# Patient Record
Sex: MALE | Race: WHITE | URBAN - METROPOLITAN AREA
[De-identification: names, ages, dates, MRNs, and addresses within clinical notes are randomized per-mention and may not be internally consistent; named-entity substitution may affect disease eponyms.]

---

## 2022-09-07 ENCOUNTER — OFFICE VISIT (OUTPATIENT)
Dept: PRIMARY CARE CLINIC | Age: 67
End: 2022-09-07
Payer: MEDICARE

## 2022-09-07 ENCOUNTER — HOSPITAL ENCOUNTER (OUTPATIENT)
Dept: GENERAL RADIOLOGY | Age: 67
Discharge: HOME | End: 2022-09-07
Attending: INTERNAL MEDICINE
Payer: MEDICARE

## 2022-09-07 ENCOUNTER — TELEPHONE (OUTPATIENT)
Dept: PRIMARY CARE CLINIC | Age: 67
End: 2022-09-07

## 2022-09-07 VITALS
HEIGHT: 72 IN | SYSTOLIC BLOOD PRESSURE: 106 MMHG | OXYGEN SATURATION: 98 % | RESPIRATION RATE: 18 BRPM | HEART RATE: 52 BPM | TEMPERATURE: 97.3 F | BODY MASS INDEX: 33.18 KG/M2 | DIASTOLIC BLOOD PRESSURE: 57 MMHG | WEIGHT: 245 LBS

## 2022-09-07 DIAGNOSIS — S51.001A OPEN WOUND OF RIGHT ELBOW, INITIAL ENCOUNTER: ICD-10-CM

## 2022-09-07 DIAGNOSIS — G25.0 ESSENTIAL TREMOR: ICD-10-CM

## 2022-09-07 DIAGNOSIS — M54.9 ACUTE UPPER BACK PAIN: ICD-10-CM

## 2022-09-07 DIAGNOSIS — R09.89 RESPIRATORY CRACKLES AT LEFT LUNG BASE: ICD-10-CM

## 2022-09-07 DIAGNOSIS — J30.1 SEASONAL ALLERGIC RHINITIS DUE TO POLLEN: ICD-10-CM

## 2022-09-07 DIAGNOSIS — R45.86 MOOD SWINGS: ICD-10-CM

## 2022-09-07 DIAGNOSIS — J44.9 CHRONIC OBSTRUCTIVE PULMONARY DISEASE, UNSPECIFIED COPD TYPE (HCC): ICD-10-CM

## 2022-09-07 DIAGNOSIS — L30.9 ECZEMA, UNSPECIFIED TYPE: ICD-10-CM

## 2022-09-07 DIAGNOSIS — D64.9 ANEMIA, UNSPECIFIED TYPE: Primary | ICD-10-CM

## 2022-09-07 DIAGNOSIS — E78.00 HYPERCHOLESTEREMIA: ICD-10-CM

## 2022-09-07 DIAGNOSIS — Z12.5 PROSTATE CANCER SCREENING: ICD-10-CM

## 2022-09-07 DIAGNOSIS — F41.9 ANXIETY: ICD-10-CM

## 2022-09-07 DIAGNOSIS — C83.10 MANTLE CELL LYMPHOMA, UNSPECIFIED BODY REGION (HCC): Primary | ICD-10-CM

## 2022-09-07 DIAGNOSIS — N40.1 BENIGN PROSTATIC HYPERPLASIA WITH LOWER URINARY TRACT SYMPTOMS, SYMPTOM DETAILS UNSPECIFIED: ICD-10-CM

## 2022-09-07 DIAGNOSIS — K21.9 GASTROESOPHAGEAL REFLUX DISEASE WITHOUT ESOPHAGITIS: ICD-10-CM

## 2022-09-07 DIAGNOSIS — Z97.4 WEARS HEARING AID IN BOTH EARS: ICD-10-CM

## 2022-09-07 PROCEDURE — G8417 CALC BMI ABV UP PARAM F/U: HCPCS | Performed by: INTERNAL MEDICINE

## 2022-09-07 PROCEDURE — 71046 X-RAY EXAM CHEST 2 VIEWS: CPT

## 2022-09-07 PROCEDURE — G8427 DOCREV CUR MEDS BY ELIG CLIN: HCPCS | Performed by: INTERNAL MEDICINE

## 2022-09-07 PROCEDURE — 72072 X-RAY EXAM THORAC SPINE 3VWS: CPT

## 2022-09-07 PROCEDURE — 99204 OFFICE O/P NEW MOD 45 MIN: CPT | Performed by: INTERNAL MEDICINE

## 2022-09-07 PROCEDURE — G8510 SCR DEP NEG, NO PLAN REQD: HCPCS | Performed by: INTERNAL MEDICINE

## 2022-09-07 PROCEDURE — 1101F PT FALLS ASSESS-DOCD LE1/YR: CPT | Performed by: INTERNAL MEDICINE

## 2022-09-07 PROCEDURE — G8536 NO DOC ELDER MAL SCRN: HCPCS | Performed by: INTERNAL MEDICINE

## 2022-09-07 PROCEDURE — 1123F ACP DISCUSS/DSCN MKR DOCD: CPT | Performed by: INTERNAL MEDICINE

## 2022-09-07 PROCEDURE — 3017F COLORECTAL CA SCREEN DOC REV: CPT | Performed by: INTERNAL MEDICINE

## 2022-09-07 RX ORDER — FLUTICASONE PROPIONATE 50 MCG
1 SPRAY, SUSPENSION (ML) NASAL AS NEEDED
COMMUNITY

## 2022-09-07 RX ORDER — ATORVASTATIN CALCIUM 20 MG/1
20 TABLET, FILM COATED ORAL DAILY
COMMUNITY

## 2022-09-07 RX ORDER — CEPHALEXIN 500 MG/1
500 CAPSULE ORAL 3 TIMES DAILY
Qty: 21 CAPSULE | Refills: 0 | Status: SHIPPED | OUTPATIENT
Start: 2022-09-07 | End: 2022-09-14

## 2022-09-07 RX ORDER — LAMOTRIGINE 200 MG/1
200 TABLET ORAL DAILY
COMMUNITY

## 2022-09-07 RX ORDER — TIOTROPIUM BROMIDE 18 UG/1
CAPSULE ORAL; RESPIRATORY (INHALATION)
COMMUNITY
Start: 2022-08-27 | End: 2022-10-27 | Stop reason: SDUPTHER

## 2022-09-07 RX ORDER — LITHIUM CARBONATE 300 MG/1
TABLET, FILM COATED, EXTENDED RELEASE ORAL
COMMUNITY
Start: 2022-06-20

## 2022-09-07 RX ORDER — WARFARIN 1 MG/1
1 TABLET ORAL DAILY
COMMUNITY
Start: 2022-06-30

## 2022-09-07 RX ORDER — GABAPENTIN 100 MG/1
100 CAPSULE ORAL 2 TIMES DAILY
COMMUNITY
Start: 2022-06-20

## 2022-09-07 RX ORDER — ALBUTEROL SULFATE 0.83 MG/ML
SOLUTION RESPIRATORY (INHALATION)
COMMUNITY
Start: 2022-08-18

## 2022-09-07 RX ORDER — PRIMIDONE 50 MG/1
TABLET ORAL
COMMUNITY
Start: 2022-06-01

## 2022-09-07 RX ORDER — AZELASTINE 1 MG/ML
SPRAY, METERED NASAL
COMMUNITY
Start: 2022-07-23

## 2022-09-07 RX ORDER — CLONAZEPAM 0.5 MG/1
0.5 TABLET ORAL
Qty: 30 TABLET | Refills: 0 | Status: SHIPPED | OUTPATIENT
Start: 2022-09-07 | End: 2022-10-09

## 2022-09-07 RX ORDER — TRIAMCINOLONE ACETONIDE 0.25 MG/G
OINTMENT TOPICAL 2 TIMES DAILY
COMMUNITY
End: 2022-09-07 | Stop reason: SDUPTHER

## 2022-09-07 RX ORDER — FINASTERIDE 5 MG/1
5 TABLET, FILM COATED ORAL DAILY
COMMUNITY
Start: 2022-09-01

## 2022-09-07 RX ORDER — DOXEPIN HYDROCHLORIDE 10 MG/1
CAPSULE ORAL
COMMUNITY
Start: 2022-08-18

## 2022-09-07 RX ORDER — MINERAL OIL
180 ENEMA (ML) RECTAL DAILY
COMMUNITY

## 2022-09-07 RX ORDER — BENZONATATE 100 MG/1
100 CAPSULE ORAL
COMMUNITY

## 2022-09-07 RX ORDER — TAMSULOSIN HYDROCHLORIDE 0.4 MG/1
CAPSULE ORAL
COMMUNITY
Start: 2022-08-08

## 2022-09-07 RX ORDER — CITALOPRAM 20 MG/1
TABLET, FILM COATED ORAL
COMMUNITY
Start: 2022-08-08

## 2022-09-07 RX ORDER — CLONAZEPAM 0.5 MG/1
0.25 TABLET ORAL AS NEEDED
COMMUNITY

## 2022-09-07 RX ORDER — TRIAMCINOLONE ACETONIDE 0.25 MG/G
OINTMENT TOPICAL 2 TIMES DAILY
Qty: 30 G | Refills: 4 | Status: SHIPPED | OUTPATIENT
Start: 2022-09-07

## 2022-09-07 RX ORDER — OMEPRAZOLE 40 MG/1
40 CAPSULE, DELAYED RELEASE ORAL DAILY
Qty: 90 CAPSULE | Refills: 1 | Status: SHIPPED | OUTPATIENT
Start: 2022-09-07

## 2022-09-07 NOTE — PROGRESS NOTES
Chief Complaint   Patient presents with    New Patient        Visit Vitals  BP (!) 106/57 (BP 1 Location: Left upper arm, BP Patient Position: Sitting)   Pulse (!) 52   Temp 97.3 °F (36.3 °C) (Temporal)   Resp 18   Ht 6' (1.829 m)   Wt 245 lb (111.1 kg)   SpO2 98%   BMI 33.23 kg/m²        Health Maintenance Due   Topic    Hepatitis C Screening     Depression Screen     COVID-19 Vaccine (1)    DTaP/Tdap/Td series (1 - Tdap)    Lipid Screen     Colorectal Cancer Screening Combo     Shingrix Vaccine Age 50> (1 of 2)    Pneumococcal 65+ years (1 - PCV)    Medicare Yearly Exam     Flu Vaccine (1)        1. Have you been to the ER, urgent care clinic since your last visit? Hospitalized since your last visit? No    2. Have you seen or consulted any other health care providers outside of the [address] [place] [address] since your last visit? Include any pap smears or colon screening.  No

## 2022-09-07 NOTE — PROGRESS NOTES
Kathy Beaulieu is a 78 y.o.  male and presents with     Chief Complaint   Patient presents with    New Patient     Pt is here to establish care. Pt used to live in [place] and moved in May. Daughters live her. Pt used to see Psychiatrist [facility]  Pt has appointment with Neurologist in November for essential tremors  Pt has appt with Pulmonologist.  Was seeing Dr Giancarlo Valenzuela in [place] - for many years. Pt used to be a   Pt is currently on oxygen , 2.5 liters, says not related to smoking. Never smoked. Has a port for chemo - got clogged - is on warfarin  Has BPH. Has GERD>. Pt goes to [facility] for mantle cell carcnioma  USed to see cardiologist , Dr Kristel Bob once a year. Had labs done in May  Had CXR in early part of the year. Macy Dakin on concrete side walk - back is hurting      Wears hearing  aids. No past medical history on file. No past surgical history on file. Current Outpatient Medications   Medication Sig    primidone (MYSOLINE) 50 mg tablet TAKE 1 AND 1/2 TABLETS BY MOUTH EVERY MORNING AND 2 TABLETS EVERY EVENING    cephALEXin (KEFLEX) 500 mg capsule Take 1 Capsule by mouth three (3) times daily for 7 days. triamcinolone acetonide (KENALOG) 0.025 % ointment Apply  to affected area two (2) times a day. omeprazole (PRILOSEC) 40 mg capsule Take 1 Capsule by mouth daily. clonazePAM (KlonoPIN) 0.5 mg tablet Take 1 Tablet by mouth nightly as needed for Anxiety. Max Daily Amount: 0.5 mg.    albuterol (PROVENTIL VENTOLIN) 2.5 mg /3 mL (0.083 %) nebu USE 1 VIAL VIA NEBULIZER EVERY 4 TO 6 HOURS    atorvastatin (LIPITOR) 20 mg tablet Take 20 mg by mouth daily. azelastine (ASTELIN) 137 mcg (0.1 %) nasal spray USE 2 SPRAYS IN EACH NOSTRIL TWICE DAILY    benzonatate (TESSALON) 100 mg capsule Take 100 mg by mouth three (3) times daily as needed.     citalopram (CELEXA) 20 mg tablet TAKE 1 TABLET BY MOUTH EVERY DAY AT 7 PM    clonazePAM (KlonoPIN) 0.5 mg tablet Take 0.25 mg by mouth as needed. doxepin (SINEquan) 10 mg capsule TAKE 1 CAPSULE BY MOUTH AT BEDTIME    fexofenadine (ALLEGRA) 180 mg tablet Take 180 mg by mouth daily. finasteride (PROSCAR) 5 mg tablet Take 5 mg by mouth daily. fluticasone propionate (FLONASE) 50 mcg/actuation nasal spray 1 Spray by Nasal route as needed. gabapentin (NEURONTIN) 100 mg capsule Take 100 mg by mouth two (2) times a day. lamoTRIgine (LaMICtal) 200 mg tablet Take 200 mg by mouth daily. lithium carbonate SR (LITHOBID) 300 mg CR tablet TAKE 2 TABLETS BY MOUTH AT 7 PM    tamsulosin (FLOMAX) 0.4 mg capsule TAKE 2 CAPSULES BY MOUTH TWICE DAILY    Spiriva with HandiHaler 18 mcg inhalation capsule INHALE THE CONTENTS OF 1 CAPSULE VIA INHALATION DEVICE EVERY DAY    warfarin (COUMADIN) 1 mg tablet Take 1 mg by mouth daily. No current facility-administered medications for this visit. Health Maintenance   Topic Date Due    Hepatitis C Screening  Never done    COVID-19 Vaccine (1) Never done    Pneumococcal 65+ years (1 - PCV) Never done    Lipid Screen  Never done    Shingrix Vaccine Age 50> (1 of 2) Never done    DTaP/Tdap/Td series (1 - Tdap) Never done    Colorectal Cancer Screening Combo  Never done    Medicare Yearly Exam  Never done    Flu Vaccine (1) Never done    Depression Screen  09/07/2023       There is no immunization history on file for this patient. No LMP for male patient. Allergies and Intolerances: Allergies   Allergen Reactions    Prazosin Anaphylaxis    Percocet [Oxycodone-Acetaminophen] Other (comments)     Hallucinate       Family History:   No family history on file. Social History:   He  reports that he has never smoked. He has never used smokeless tobacco.  He  reports no history of alcohol use.             Review of Systems:   General: negative for - chills, fatigue, fever, weight change  Psych: negative for - anxiety, depression, irritability or mood swings  ENT: negative for - headaches, hearing change, nasal congestion, oral lesions, sneezing or sore throat  Heme/ Lymph: negative for - bleeding problems, bruising, pallor or swollen lymph nodes  Endo: negative for - hot flashes, polydipsia/polyuria or temperature intolerance  Resp: negative for - cough, shortness of breath or wheezing  CV: negative for - chest pain, edema or palpitations  GI: negative for - abdominal pain, change in bowel habits, constipation, diarrhea or nausea/vomiting  : negative for - dysuria, hematuria, incontinence, pelvic pain or vulvar/vaginal symptoms  MSK: negative for - joint pain, joint swelling or muscle pain  Neuro: negative for - confusion, headaches, seizures or weakness  Derm: negative for - dry skin, hair changes, rash or skin lesion changes          Physical:   Vitals:   Vitals:    09/07/22 1131   BP: (!) 106/57   Pulse: (!) 52   Resp: 18   Temp: 97.3 °F (36.3 °C)   TempSrc: Temporal   SpO2: 98%   Weight: 245 lb (111.1 kg)   Height: 6' (1.829 m)           Exam:   HEENT- atraumatic,normocephalic, awake, oriented, well nourished, on 2.5 litres of oxygen, eczema around the external ears. Neck - supple,no enlarged lymph nodes, no JVD, no thyromegaly  Chest- CTA, no rhonchi, crackles left base  Heart- rrr, no murmurs / gallop/rub  Abdomen- soft,BS+,NT, no hepatosplenomegaly  Ext - no c/c/edema   Neuro- no focal deficits. Power 5/5 all extremities,essential tremors in both hands  Skin - warm,dry, no obvious rashes. Review of Data:   LABS:   No results found for: WBC, HGB, HCT, PLT, HGBEXT, HCTEXT, PLTEXT, HGBEXT, HCTEXT, PLTEXT  No results found for: NA, K, CL, CO2, GLU, BUN, CREA  No results found for: CHOL, CHOLX, CHLST, CHOLV, HDL, HDLP, LDL, LDLC, DLDLP, TGLX, TRIGL, TRIGP  No components found for: GPT        Impression / Plan:        ICD-10-CM ICD-9-CM    1. Mantle cell lymphoma, unspecified body region ([facility]  C83.10 200.40       2.  Hypercholesteremia  E78.00 272.0 CBC WITH AUTOMATED DIFF METABOLIC PANEL, COMPREHENSIVE      LIPID PANEL      3. Benign prostatic hyperplasia with lower urinary tract symptoms, symptom details unspecified  N40.1 600.01 REFERRAL TO UROLOGY      4. Chronic obstructive pulmonary disease, unspecified COPD type ([facility]  J44.9 496       5. Anxiety  F41.9 300.00 REFERRAL TO PSYCHIATRY      clonazePAM (KlonoPIN) 0.5 mg tablet      6. Gastroesophageal reflux disease without esophagitis  K21.9 530.81 omeprazole (PRILOSEC) 40 mg capsule      7. Mood swings  R45.86 799.24 REFERRAL TO PSYCHIATRY      8. Seasonal allergic rhinitis due to pollen  J30.1 477.0       9. Prostate cancer screening  Z12.5 V76.44 PSA SCREENING (SCREENING)      10. Essential tremor  G25.0 333.1       11. Acute upper back pain  M54.9 724.5 XR SPINE THORAC 3 V     338.19       12. Wears hearing aid in both ears  Z97.4 [id number]       13. Respiratory crackles at left lung base  R09.89 786.7 XR CHEST PA LAT      14. Open wound of right elbow, initial encounter  S51.001A 881.01 cephALEXin (KEFLEX) 500 mg capsule      15. Eczema, unspecified type  L30.9 692.9 triamcinolone acetonide (KENALOG) 0.025 % ointment        Gave list of Mental health providers in the region  Informed pt that he could still contnue seeing Psychiatrist in [place] till he establishes new Psych in [place] to patient risk benefits of the medications. Advised patient to stop meds if having any side effects. Pt verbalized understanding of the instructions. I have discussed the diagnosis with the patient and the intended plan as seen in the above orders. The patient has received an after-visit summary and questions were answered concerning future plans. I have discussed medication side effects and warnings with the patient as well. I have reviewed the plan of care with the patient, accepted their input and they are in agreement with the treatment goals. Reviewed plan of care.  Patient has provided input and agrees with goals.    Follow-up and Dispositions    Return in about 3 months (around 12/7/2022).          Blayne Taylor MD

## 2022-09-08 ENCOUNTER — TELEPHONE (OUTPATIENT)
Dept: PRIMARY CARE CLINIC | Age: 67
End: 2022-09-08

## 2022-09-08 NOTE — PROGRESS NOTES
Rt anurag diaphragm is elevated and there is small amount of fluid in the lung on the left side. Pt should make follow up appt in a few months. Will repeat CXR in a few months  WOuld advise incentive spirometry .   Reduce fluid intake

## 2022-09-09 ENCOUNTER — TELEPHONE (OUTPATIENT)
Dept: PRIMARY CARE CLINIC | Age: 67
End: 2022-09-09

## 2022-09-09 NOTE — TELEPHONE ENCOUNTER
Xray results reviewed with patient. Patient also had questions about his other xray that was done. However that one has not been reviewed. Advised patient I will get a message to the provider and once it has been reviewed I will call him back,  Also provided patient with the link to setup his [employer] account.

## 2022-09-09 NOTE — TELEPHONE ENCOUNTER
Spoke to patient and informed about lab results  and about imaging results  patient will come in next week to get labs and stool kit.

## 2022-09-09 NOTE — TELEPHONE ENCOUNTER
----- Message from Grover King MD sent at 9/7/2022 10:51 PM EDT -----  Rt anurag diaphragm is elevated and there is small amount of fluid in the lung on the left side. Pt should make follow up appt in a few months. Will repeat CXR in a few months  WOuld advise incentive spirometry .   Reduce fluid intake

## 2022-09-09 NOTE — TELEPHONE ENCOUNTER
----- Message from Isaías Perez MD sent at 9/7/2022 11:12 PM EDT -----  All labs are normal except mild anemia  I have ordered iron studies and stool test  Patient may get it done

## 2022-09-12 NOTE — PROGRESS NOTES
Xray shows - 1. Age-indeterminate wedging deformity of a lower thoracic vertebral body.     Could be related to old  injury  or fall

## 2022-09-13 ENCOUNTER — TELEPHONE (OUTPATIENT)
Dept: PRIMARY CARE CLINIC | Age: 67
End: 2022-09-13

## 2022-09-13 DIAGNOSIS — D64.9 ANEMIA, UNSPECIFIED TYPE: Primary | ICD-10-CM

## 2022-09-13 NOTE — TELEPHONE ENCOUNTER
Patient aware of xray results of the spine. Xray shows - 1. Age-indeterminate wedging deformity of a lower thoracic vertebral body. Could be related to old  injury  or fall     Results mailed to patient.

## 2022-09-15 ENCOUNTER — TELEPHONE (OUTPATIENT)
Dept: PRIMARY CARE CLINIC | Age: 67
End: 2022-09-15

## 2022-09-15 NOTE — TELEPHONE ENCOUNTER
----- Message from Grey Castellanos MD sent at 9/14/2022  8:25 PM EDT -----  Iron stores are normal.  There is no evidence of iron deficiency

## 2022-09-17 LAB — HEMOCCULT STL QL IA: POSITIVE

## 2022-09-20 DIAGNOSIS — R19.5 POSITIVE OCCULT STOOL BLOOD TEST: Primary | ICD-10-CM

## 2022-09-21 ENCOUNTER — TELEPHONE (OUTPATIENT)
Dept: PRIMARY CARE CLINIC | Age: 67
End: 2022-09-21

## 2022-09-21 NOTE — TELEPHONE ENCOUNTER
----- Message from Maria Luz Franco MD sent at 9/20/2022  8:06 AM EDT -----  Hemoccult test is positive.   I am referring patient to gastroenterology

## 2022-10-07 DIAGNOSIS — F41.9 ANXIETY: ICD-10-CM

## 2022-10-09 RX ORDER — CLONAZEPAM 0.5 MG/1
TABLET ORAL
Qty: 30 TABLET | Refills: 0 | Status: SHIPPED | OUTPATIENT
Start: 2022-10-09

## 2022-10-10 DIAGNOSIS — F41.9 ANXIETY: Primary | ICD-10-CM

## 2022-11-10 ENCOUNTER — OFFICE VISIT (OUTPATIENT)
Dept: PRIMARY CARE CLINIC | Age: 67
End: 2022-11-10
Payer: MEDICARE

## 2022-11-10 VITALS
RESPIRATION RATE: 18 BRPM | BODY MASS INDEX: 32.64 KG/M2 | SYSTOLIC BLOOD PRESSURE: 101 MMHG | HEART RATE: 53 BPM | OXYGEN SATURATION: 98 % | HEIGHT: 72 IN | WEIGHT: 241 LBS | DIASTOLIC BLOOD PRESSURE: 60 MMHG | TEMPERATURE: 97.3 F

## 2022-11-10 DIAGNOSIS — Z00.00 MEDICARE ANNUAL WELLNESS VISIT, INITIAL: Primary | ICD-10-CM

## 2022-11-10 DIAGNOSIS — N40.1 BENIGN PROSTATIC HYPERPLASIA WITH LOWER URINARY TRACT SYMPTOMS, SYMPTOM DETAILS UNSPECIFIED: ICD-10-CM

## 2022-11-10 DIAGNOSIS — J44.9 CHRONIC OBSTRUCTIVE PULMONARY DISEASE, UNSPECIFIED COPD TYPE (HCC): ICD-10-CM

## 2022-11-10 DIAGNOSIS — G25.0 ESSENTIAL TREMOR: ICD-10-CM

## 2022-11-10 DIAGNOSIS — C83.10 MANTLE CELL LYMPHOMA, UNSPECIFIED BODY REGION (HCC): ICD-10-CM

## 2022-11-10 DIAGNOSIS — M1A.9XX1 TOPHI: ICD-10-CM

## 2022-11-10 DIAGNOSIS — F41.9 ANXIETY: ICD-10-CM

## 2022-11-10 DIAGNOSIS — E78.00 HYPERCHOLESTEREMIA: ICD-10-CM

## 2022-11-10 DIAGNOSIS — K21.9 GASTROESOPHAGEAL REFLUX DISEASE WITHOUT ESOPHAGITIS: ICD-10-CM

## 2022-11-10 PROCEDURE — 1123F ACP DISCUSS/DSCN MKR DOCD: CPT | Performed by: INTERNAL MEDICINE

## 2022-11-10 PROCEDURE — 3017F COLORECTAL CA SCREEN DOC REV: CPT | Performed by: INTERNAL MEDICINE

## 2022-11-10 PROCEDURE — G8432 DEP SCR NOT DOC, RNG: HCPCS | Performed by: INTERNAL MEDICINE

## 2022-11-10 PROCEDURE — G0438 PPPS, INITIAL VISIT: HCPCS | Performed by: INTERNAL MEDICINE

## 2022-11-10 PROCEDURE — G8427 DOCREV CUR MEDS BY ELIG CLIN: HCPCS | Performed by: INTERNAL MEDICINE

## 2022-11-10 PROCEDURE — G8536 NO DOC ELDER MAL SCRN: HCPCS | Performed by: INTERNAL MEDICINE

## 2022-11-10 PROCEDURE — 1101F PT FALLS ASSESS-DOCD LE1/YR: CPT | Performed by: INTERNAL MEDICINE

## 2022-11-10 PROCEDURE — G8417 CALC BMI ABV UP PARAM F/U: HCPCS | Performed by: INTERNAL MEDICINE

## 2022-11-10 RX ORDER — CLONAZEPAM 0.5 MG/1
0.5 TABLET ORAL
Qty: 30 TABLET | Refills: 0 | Status: SHIPPED | OUTPATIENT
Start: 2022-11-10

## 2022-11-10 NOTE — PROGRESS NOTES
[facility] Medicare Wellness Visit      Domenic Rodriguez is a 78 y.o. male and presents for Annual Medicare Wellness Visit. Vitals:    11/10/22 1132   BP: 101/60   Pulse: (!) 53   Resp: 18   Temp: 97.3 °F (36.3 °C)   TempSrc: Temporal   SpO2: 98%   Weight: 241 lb (109.3 kg)   Height: 6' (1.829 m)   PainSc:   0 - No pain        Depression Screen:   3 most recent PHQ Screens 11/10/2022   Little interest or pleasure in doing things Not at all   Feeling down, depressed, irritable, or hopeless Not at all   Total Score PHQ 2 0   Trouble falling or staying asleep, or sleeping too much -   Feeling tired or having little energy -   Poor appetite, weight loss, or overeating -   Feeling bad about yourself - or that you are a failure or have let yourself or your family down -   Trouble concentrating on things such as school, work, reading, or watching TV -   Moving or speaking so slowly that other people could have noticed; or the opposite being so fidgety that others notice -   Thoughts of being better off dead, or hurting yourself in some way -   PHQ 9 Score -   How difficult have these problems made it for you to do your work, take care of your home and get along with others -       Fall Risk Assessment:    Fall Risk Assessment, last 12 mths 9/7/2022   Able to walk? Yes   Fall in past 12 months? 1   Do you feel unsteady? 1   Are you worried about falling 1   Number of falls in past 12 months 1   Fall with injury? 1       Abuse Screen:   Abuse Screening Questionnaire 11/10/2022   Do you ever feel afraid of your partner? N   Are you in a relationship with someone who physically or mentally threatens you? N   Is it safe for you to go home? Y       Health Maintenance Due   Topic    Hepatitis C Screening     COVID-19 Vaccine (1)    Pneumococcal 65+ years (1 - PCV)    Shingrix Vaccine Age 50> (1 of 2)    DTaP/Tdap/Td series (1 - Tdap)    Medicare Yearly Exam        1. Have you been to the ER, urgent care clinic since your last visit? Hospitalized since your last visit? No    2. Have you seen or consulted any other health care providers outside of the [address] [place] [address] since your last visit? Include any pap smears or colon screening.  No

## 2022-11-10 NOTE — PROGRESS NOTES
Domenic Rodriguez is a 78 y.o.  male and presents with     Chief Complaint   Patient presents with    Form Completion     DMV forms     Annual Wellness Visit     Look at right elbow      Pt is seeing Psych - [facility]  HAs appt with Neurologist- DR Zuri Haney next month  Pt had a fall and injured his face and elbow and devloped a swelling over the rt elbow. At the time he was taking blood thinner. Pt had a port for mantle cell lymhoma and was anticoagulated at the time  Pt is requesting clonopin till he sees Neurologts. Had hem pos stools - has appt with GI, Dec 13th. Gets crusting around left ear. On 2 liters ox oxygen. No past medical history on file. No past surgical history on file. Current Outpatient Medications   Medication Sig    clonazePAM (KlonoPIN) 0.5 mg tablet Take 1 Tablet by mouth nightly as needed for Anxiety. Max Daily Amount: 0.5 mg. Spiriva with HandiHaler 18 mcg inhalation capsule Take 1 Capsule by inhalation daily. albuterol (PROVENTIL VENTOLIN) 2.5 mg /3 mL (0.083 %) nebu USE 1 VIAL VIA NEBULIZER EVERY 4 TO 6 HOURS    atorvastatin (LIPITOR) 20 mg tablet Take 20 mg by mouth daily. azelastine (ASTELIN) 137 mcg (0.1 %) nasal spray USE 2 SPRAYS IN EACH NOSTRIL TWICE DAILY    benzonatate (TESSALON) 100 mg capsule Take 100 mg by mouth three (3) times daily as needed. citalopram (CELEXA) 20 mg tablet TAKE 1 TABLET BY MOUTH EVERY DAY AT 7 PM    clonazePAM (KlonoPIN) 0.5 mg tablet Take 0.25 mg by mouth as needed. doxepin (SINEquan) 10 mg capsule TAKE 1 CAPSULE BY MOUTH AT BEDTIME    fexofenadine (ALLEGRA) 180 mg tablet Take 180 mg by mouth daily. finasteride (PROSCAR) 5 mg tablet Take 5 mg by mouth daily. fluticasone propionate (FLONASE) 50 mcg/actuation nasal spray 1 Spray by Nasal route as needed. gabapentin (NEURONTIN) 100 mg capsule Take 100 mg by mouth two (2) times a day. lamoTRIgine (LaMICtal) 200 mg tablet Take 200 mg by mouth daily.     lithium carbonate SR (LITHOBID) 300 mg CR tablet TAKE 2 TABLETS BY MOUTH AT 7 PM    primidone (MYSOLINE) 50 mg tablet TAKE 1 AND 1/2 TABLETS BY MOUTH EVERY MORNING AND 2 TABLETS EVERY EVENING    tamsulosin (FLOMAX) 0.4 mg capsule TAKE 2 CAPSULES BY MOUTH TWICE DAILY    warfarin (COUMADIN) 1 mg tablet Take 1 mg by mouth daily. triamcinolone acetonide (KENALOG) 0.025 % ointment Apply  to affected area two (2) times a day. omeprazole (PRILOSEC) 40 mg capsule Take 1 Capsule by mouth daily. No current facility-administered medications for this visit. Health Maintenance   Topic Date Due    COVID-19 Vaccine (1) Never done    Pneumococcal 65+ years (1 - PCV) Never done    Shingrix Vaccine Age 50> (1 of 2) Never done    DTaP/Tdap/Td series (1 - Tdap) Never done    Lipid Screen  09/07/2023    Colorectal Cancer Screening Combo  09/15/2023    Depression Screen  11/10/2023    Medicare Yearly Exam  11/11/2023    Flu Vaccine  Completed    Hepatitis C Screening  Discontinued     Immunization History   Administered Date(s) Administered    Influenza High Dose Vaccine PF 10/11/2022     No LMP for male patient. Allergies and Intolerances: Allergies   Allergen Reactions    Prazosin Anaphylaxis    Percocet [Oxycodone-Acetaminophen] Other (comments)     Hallucinate       Family History:   No family history on file. Social History:   He  reports that he has never smoked. He has never used smokeless tobacco.  He  reports no history of alcohol use.             Review of Systems:   General: negative for - chills, fatigue, fever, weight change  Psych: negative for - anxiety, depression, irritability or mood swings  ENT: negative for - headaches, hearing change, nasal congestion, oral lesions, sneezing or sore throat  Heme/ Lymph: negative for - bleeding problems, bruising, pallor or swollen lymph nodes  Endo: negative for - hot flashes, polydipsia/polyuria or temperature intolerance  Resp: negative for - cough, shortness of breath or wheezing  CV: negative for - chest pain, edema or palpitations  GI: negative for - abdominal pain, change in bowel habits, constipation, diarrhea or nausea/vomiting  : negative for - dysuria, hematuria, incontinence, pelvic pain or vulvar/vaginal symptoms  MSK: negative for - joint pain, joint swelling or muscle pain  Neuro: negative for - confusion, headaches, seizures or weakness  Derm: negative for - dry skin, hair changes, rash or skin lesion changes          Physical:   Vitals:   Vitals:    11/10/22 1132   BP: 101/60   Pulse: (!) 53   Resp: 18   Temp: 97.3 °F (36.3 °C)   TempSrc: Temporal   SpO2: 98%   Weight: 241 lb (109.3 kg)   Height: 6' (1.829 m)           Exam:   HEENT- atraumatic,normocephalic, awake, oriented, well nourished, crusting around the ear. Chest- CTA, no rhonchi, no crackles  Heart- rrr, no murmurs / gallop/rub  Abdomen- soft,BS+,NT, no hepatosplenomegaly  Ext - no c/c/edema , tophus over right elbow  Neuro- no focal deficits. Power 5/5 all extremities  Skin - warm,dry, no obvious rashes. Review of Data:   LABS:   Lab Results   Component Value Date/Time    WBC 4.6 09/07/2022 12:44 PM    HGB 11.5 (L) 09/07/2022 12:44 PM    HCT 36.0 (L) 09/07/2022 12:44 PM    PLATELET 122 78/83/6570 12:44 PM     Lab Results   Component Value Date/Time    Sodium 140 09/07/2022 12:44 PM    Potassium 4.4 09/07/2022 12:44 PM    Chloride 105 09/07/2022 12:44 PM    CO2 32 09/07/2022 12:44 PM    Glucose 93 09/07/2022 12:44 PM    BUN 18 09/07/2022 12:44 PM    Creatinine 0.91 09/07/2022 12:44 PM     Lab Results   Component Value Date/Time    Cholesterol, total 141 09/07/2022 12:44 PM    HDL Cholesterol 49 09/07/2022 12:44 PM    LDL, calculated 62.4 09/07/2022 12:44 PM    Triglyceride 148 09/07/2022 12:44 PM     No components found for: GPT        Impression / Plan:        ICD-10-CM ICD-9-CM    1. Medicare annual wellness visit, initial  Z00.00 V70.0       2.  Anxiety  F41.9 300.00 clonazePAM (KlonoPIN) 0.5 mg tablet      3. Mantle cell lymphoma, unspecified body region ([facility]  C83.10 200.40       4. Hypercholesteremia  E78.00 272.0       5. Benign prostatic hyperplasia with lower urinary tract symptoms, symptom details unspecified  N40.1 600.01       6. Chronic obstructive pulmonary disease, unspecified COPD type ([facility]  J44.9 496       7. Gastroesophageal reflux disease without esophagitis  K21.9 530.81       8. Essential tremor  G25.0 333.1       9. Tophi  M1A. 9XX1 274.03 URIC ACID      CYCLIC CITRUL PEPTIDE AB, IGG      CYCLIC CITRUL PEPTIDE AB, IGG      URIC ACID        DMV forms filled  Explained to patient risk benefits of the medications. Advised patient to stop meds if having any side effects. Pt verbalized understanding of the instructions. I have discussed the diagnosis with the patient and the intended plan as seen in the above orders. The patient has received an after-visit summary and questions were answered concerning future plans. I have discussed medication side effects and warnings with the patient as well. I have reviewed the plan of care with the patient, accepted their input and they are in agreement with the treatment goals. Reviewed plan of care. Patient has provided input and agrees with goals. Follow-up and Dispositions    Return in about 4 months (around 3/10/2023). Felix Buenrostro MD      ------------------------------------------------------    (AWV) The Initial Medicare Annual Wellness Exam PROGRESS NOTE    This is an Initial Medicare Annual Wellness Exam (AWV) (Performed 12 months after IPPE or effective date of Medicare Part B enrollment, Once in a lifetime)    I have reviewed the patient's medical history in detail and updated the computerized patient record. Morena Khalil is a 78 y.o.  male and presents for an annual wellness exam       ROS   Negative except anxiety, tremors    All other systems reviewed and are negative.     History   No past medical history on file. No past surgical history on file. Current Outpatient Medications   Medication Sig Dispense Refill    clonazePAM (KlonoPIN) 0.5 mg tablet Take 1 Tablet by mouth nightly as needed for Anxiety. Max Daily Amount: 0.5 mg. 30 Tablet 0    Spiriva with HandiHaler 18 mcg inhalation capsule Take 1 Capsule by inhalation daily. 30 Capsule 3    albuterol (PROVENTIL VENTOLIN) 2.5 mg /3 mL (0.083 %) nebu USE 1 VIAL VIA NEBULIZER EVERY 4 TO 6 HOURS      atorvastatin (LIPITOR) 20 mg tablet Take 20 mg by mouth daily. azelastine (ASTELIN) 137 mcg (0.1 %) nasal spray USE 2 SPRAYS IN EACH NOSTRIL TWICE DAILY      benzonatate (TESSALON) 100 mg capsule Take 100 mg by mouth three (3) times daily as needed. citalopram (CELEXA) 20 mg tablet TAKE 1 TABLET BY MOUTH EVERY DAY AT 7 PM      clonazePAM (KlonoPIN) 0.5 mg tablet Take 0.25 mg by mouth as needed. doxepin (SINEquan) 10 mg capsule TAKE 1 CAPSULE BY MOUTH AT BEDTIME      fexofenadine (ALLEGRA) 180 mg tablet Take 180 mg by mouth daily. finasteride (PROSCAR) 5 mg tablet Take 5 mg by mouth daily. fluticasone propionate (FLONASE) 50 mcg/actuation nasal spray 1 Spray by Nasal route as needed. gabapentin (NEURONTIN) 100 mg capsule Take 100 mg by mouth two (2) times a day. lamoTRIgine (LaMICtal) 200 mg tablet Take 200 mg by mouth daily. lithium carbonate SR (LITHOBID) 300 mg CR tablet TAKE 2 TABLETS BY MOUTH AT 7 PM      primidone (MYSOLINE) 50 mg tablet TAKE 1 AND 1/2 TABLETS BY MOUTH EVERY MORNING AND 2 TABLETS EVERY EVENING      tamsulosin (FLOMAX) 0.4 mg capsule TAKE 2 CAPSULES BY MOUTH TWICE DAILY      warfarin (COUMADIN) 1 mg tablet Take 1 mg by mouth daily. triamcinolone acetonide (KENALOG) 0.025 % ointment Apply  to affected area two (2) times a day. 30 g 4    omeprazole (PRILOSEC) 40 mg capsule Take 1 Capsule by mouth daily.  90 Capsule 1     Allergies   Allergen Reactions    Prazosin Anaphylaxis    Percocet [Oxycodone-Acetaminophen] Other (comments)     Hallucinate     No family history on file. Social History     Tobacco Use    Smoking status: Never    Smokeless tobacco: Never   Substance Use Topics    Alcohol use: Never     There is no problem list on file for this patient. Health Maintenance History  Immunizations reviewed, uptodate as per pt., done in [place]  colonoscopy:uptodate,           Depression Risk Factor Screening:      Patient Health Questionnaire (PHQ-2)   Over the last 2 weeks, how often have you been bothered by any of the following problems? Little interest or pleasure in doing things? Not at all. [0]  Feeling down, depressed, or hopeless? Not at all. [0]    Total Score: 0/6  PHQ-2 Assessment Scoring:   A score of 2 or more requires further screening with the PHQ-9    Alcohol Risk Factor Screening:     Women: On any occasion during the past 3 months, have you had more than 3 drinks containing alcohol? Do you average more than 7 drinks per week? Men: On any occasion during the past 3 months, have you had more than 4 drinks containing alcohol? Do you average more than 14 drinks per week? Functional Ability and Level of Safety:     Hearing Loss    Hearing is good. The patient wears hearing aids. Activities of Daily Living   Self-care. Requires assistance with: no ADLs    Fall Risk   No fall risk factors    Abuse Screen   Patient is not abused  None    Examination   Physical Examination  Vitals:    11/10/22 1132   BP: 101/60   Pulse: (!) 53   Resp: 18   Temp: 97.3 °F (36.3 °C)   TempSrc: Temporal   SpO2: 98%   Weight: 241 lb (109.3 kg)   Height: 6' (1.829 m)   PainSc:   0 - No pain      Body mass index is 32.69 kg/m².      Evaluation of Cognitive Function:  Mood/affect:normal  Appearance:normal  Family member/caregiver input:wife    alert, well appearing, and in no distress    Patient Care Team:  Carlos Manuel Lawrence MD as PCP - General (Internal Medicine Physician)  Jon Redmond Carin Muhammad MD as PCP - [facility] Empaneled Provider    End-of-life planning  Advanced Directive in the case than an injury or illness causes the patient to be unable to make health care decisions    Health Care Directive or Living Will: no    Advice/Referrals/Counselling/Plan:   Education and counseling provided:  Are appropriate based on today's review and evaluation  Include in education list (weight loss, physical activity, smoking cessation, fall prevention, and nutrition)  current treatment plan is effective, no change in therapy. Brief written plan, checklist    I have discussed the diagnosis with the patient and the intended plan as seen in the above orders. The patient has received an after-visit summary and questions were answered concerning future plans. I have discussed medication side effects and warnings with the patient as well. I have reviewed the plan of care with the patient, accepted their input and they are in agreement with the treatment goals. Follow-up and Dispositions    Return in about 4 months (around 3/10/2023).            ____________________________________________________________ subjective

## 2022-11-11 LAB — URATE SERPL-MCNC: 4.2 MG/DL (ref 3.5–7.2)

## 2022-11-13 LAB — CCP IGA+IGG SERPL IA-ACNC: 5 UNITS (ref 0–19)

## 2022-11-14 ENCOUNTER — TELEPHONE (OUTPATIENT)
Dept: PRIMARY CARE CLINIC | Age: 67
End: 2022-11-14

## 2022-11-14 NOTE — TELEPHONE ENCOUNTER
Patient calling because he wants to be seen by Dr. Isabella Rice for sinus issues, Patient stated  cold medication he is taking his not helping and his covid test was negative. Please call patient.

## 2022-11-14 NOTE — TELEPHONE ENCOUNTER
Patient called saying he is having excessive sinus drainage and doesn't know if he has covid or if its a cold.

## 2022-11-28 RX ORDER — AZELASTINE 1 MG/ML
1 SPRAY, METERED NASAL 2 TIMES DAILY
Qty: 1 EACH | Refills: 5 | Status: SHIPPED | OUTPATIENT
Start: 2022-11-28

## 2022-12-26 DIAGNOSIS — F41.9 ANXIETY: ICD-10-CM

## 2022-12-28 RX ORDER — CLONAZEPAM 0.5 MG/1
TABLET ORAL
Qty: 15 TABLET | Refills: 0 | Status: SHIPPED | OUTPATIENT
Start: 2022-12-28

## 2023-03-06 DIAGNOSIS — K21.9 GASTROESOPHAGEAL REFLUX DISEASE WITHOUT ESOPHAGITIS: ICD-10-CM

## 2023-03-06 RX ORDER — OMEPRAZOLE 40 MG/1
40 CAPSULE, DELAYED RELEASE ORAL DAILY
Qty: 90 CAPSULE | Refills: 1 | Status: SHIPPED | OUTPATIENT
Start: 2023-03-06

## 2023-03-10 ENCOUNTER — OFFICE VISIT (OUTPATIENT)
Dept: PRIMARY CARE CLINIC | Age: 68
End: 2023-03-10

## 2023-03-10 VITALS
BODY MASS INDEX: 33.18 KG/M2 | RESPIRATION RATE: 16 BRPM | SYSTOLIC BLOOD PRESSURE: 107 MMHG | TEMPERATURE: 97.8 F | WEIGHT: 245 LBS | DIASTOLIC BLOOD PRESSURE: 68 MMHG | OXYGEN SATURATION: 98 % | HEART RATE: 60 BPM | HEIGHT: 72 IN

## 2023-03-10 DIAGNOSIS — E78.00 HYPERCHOLESTEREMIA: ICD-10-CM

## 2023-03-10 DIAGNOSIS — R45.86 MOOD SWINGS: ICD-10-CM

## 2023-03-10 DIAGNOSIS — K21.9 GASTROESOPHAGEAL REFLUX DISEASE WITHOUT ESOPHAGITIS: Primary | ICD-10-CM

## 2023-03-10 DIAGNOSIS — J44.9 CHRONIC OBSTRUCTIVE PULMONARY DISEASE, UNSPECIFIED COPD TYPE (HCC): ICD-10-CM

## 2023-03-10 DIAGNOSIS — R06.09 DOE (DYSPNEA ON EXERTION): ICD-10-CM

## 2023-03-10 DIAGNOSIS — C83.10 MANTLE CELL LYMPHOMA, UNSPECIFIED BODY REGION (HCC): ICD-10-CM

## 2023-03-10 DIAGNOSIS — G25.0 ESSENTIAL TREMOR: ICD-10-CM

## 2023-03-10 DIAGNOSIS — J98.6 CHRONICALLY ELEVATED HEMIDIAPHRAGM: ICD-10-CM

## 2023-03-10 NOTE — PROGRESS NOTES
Chief Complaint   Patient presents with    Follow-up       There were no vitals taken for this visit. 1. Have you been to the ER, urgent care clinic since your last visit? Hospitalized since your last visit? No    2. Have you seen or consulted any other health care providers outside of the [address] [place] [address] since your last visit? Include any pap smears or colon screening. No      3. For patients aged 39-70: Has the patient had a colonoscopy / FIT/ Cologuard? Yes - Care Gap present.  Most recent result on file

## 2023-03-10 NOTE — PROGRESS NOTES
Anni Marquez is a 75 y.o.  male and presents with     Chief Complaint   Patient presents with    Follow-up       Pt is seeing Dr Lissa Fox, [facility] and Neurology , Dr Iván iJ  Pt has h/o mantle cell carcinoma  Pt has appt with GI next month for hem pos stools. PSA is zero but pt does not have h/o prostate cancer    Pt stopped taking warfarin as he is not getting any more chemo for mantle cell carcinoma in the bones. Pt is on 2 liters of oxygen , for COPD,  ,sees Pulmonary  Pt never smoked   Pt feels sleepy by lunch time  Pt starts staggering in the afternoon  Takes 2 hour nap in the afternoon. Pt has sleep apnoea and uses CPAP. On the right side of the diaphragm is elevated. Patient reports that he has never smoked cigarettes. He is not sure why he has COPD  He sees pulmonary specialist    No past medical history on file. No past surgical history on file. Current Outpatient Medications   Medication Sig    omeprazole (PRILOSEC) 40 mg capsule TAKE 1 CAPSULE BY MOUTH DAILY    clonazePAM (KlonoPIN) 0.5 mg tablet TAKE 1 TABLET BY MOUTH EVERY NIGHT AS NEEDED FOR ANXIETY. MAX DAILY AMOUNT: 0.5 MG    azelastine (ASTELIN) 137 mcg (0.1 %) nasal spray 1 [place] by Both Nostrils route two (2) times a day. Use in each nostril as directed    Spiriva with HandiHaler 18 mcg inhalation capsule Take 1 Capsule by inhalation daily. albuterol (PROVENTIL VENTOLIN) 2.5 mg /3 mL (0.083 %) nebu USE 1 VIAL VIA NEBULIZER EVERY 4 TO 6 HOURS    atorvastatin (LIPITOR) 20 mg tablet Take 20 mg by mouth daily. benzonatate (TESSALON) 100 mg capsule Take 100 mg by mouth three (3) times daily as needed. citalopram (CELEXA) 20 mg tablet TAKE 1 TABLET BY MOUTH EVERY DAY AT 7 PM    clonazePAM (KlonoPIN) 0.5 mg tablet Take 0.25 mg by mouth as needed. doxepin (SINEquan) 10 mg capsule TAKE 1 CAPSULE BY MOUTH AT BEDTIME    fexofenadine (ALLEGRA) 180 mg tablet Take 180 mg by mouth daily.     finasteride (PROSCAR) 5 mg tablet Take 5 mg by mouth daily. fluticasone propionate (FLONASE) 50 mcg/actuation nasal spray 1 Spray by Nasal route as needed. gabapentin (NEURONTIN) 100 mg capsule Take 100 mg by mouth two (2) times a day. lamoTRIgine (LaMICtal) 200 mg tablet Take 200 mg by mouth daily. lithium carbonate SR (LITHOBID) 300 mg CR tablet TAKE 2 TABLETS BY MOUTH AT 7 PM    primidone (MYSOLINE) 50 mg tablet TAKE 1 AND 1/2 TABLETS BY MOUTH EVERY MORNING AND 2 TABLETS EVERY EVENING    tamsulosin (FLOMAX) 0.4 mg capsule TAKE 2 CAPSULES BY MOUTH TWICE DAILY    triamcinolone acetonide (KENALOG) 0.025 % ointment Apply  to affected area two (2) times a day. No current facility-administered medications for this visit. Health Maintenance   Topic Date Due    COVID-19 Vaccine (1) Never done    Pneumococcal 65+ years (1 - PCV) Never done    Shingles Vaccine (1 of 2) Never done    DTaP/Tdap/Td series (1 - Tdap) Never done    Lipid Screen  09/07/2023    Colorectal Cancer Screening Combo  09/15/2023    Depression Screen  11/10/2023    Medicare Yearly Exam  11/11/2023    Flu Vaccine  Completed    Hepatitis C Screening  Discontinued     Immunization History   Administered Date(s) Administered    Influenza High Dose Vaccine PF 10/11/2022     No LMP for male patient. Allergies and Intolerances: Allergies   Allergen Reactions    Prazosin Anaphylaxis    Percocet [Oxycodone-Acetaminophen] Other (comments)     Hallucinate       Family History:   No family history on file. Social History:   He  reports that he has never smoked. He has never used smokeless tobacco.  He  reports no history of alcohol use.             Review of Systems:   General: negative for - chills, fatigue, fever, weight change  Psych: negative for - anxiety, depression, irritability or mood swings  ENT: negative for - headaches, hearing change, nasal congestion, oral lesions, sneezing or sore throat  Heme/ Lymph: negative for - bleeding problems, bruising, pallor or swollen lymph nodes  Endo: negative for - hot flashes, polydipsia/polyuria or temperature intolerance  Resp: negative for - cough, shortness of breath or wheezing  CV: negative for - chest pain, edema or palpitations  GI: negative for - abdominal pain, change in bowel habits, constipation, diarrhea or nausea/vomiting  : negative for - dysuria, hematuria, incontinence, pelvic pain or vulvar/vaginal symptoms  MSK: negative for - joint pain, joint swelling or muscle pain  Neuro: negative for - confusion, headaches, seizures or weakness  Derm: negative for - dry skin, hair changes, rash or skin lesion changes          Physical:   Vitals:   Vitals:    03/10/23 1058   BP: 107/68   Pulse: 60   Resp: 16   Temp: 97.8 °F (36.6 °C)   TempSrc: Temporal   SpO2: 98%   Weight: 245 lb (111.1 kg)   Height: 6' (1.829 m)           Exam:   HEENT- atraumatic,normocephalic, awake, oriented, well nourished, on 2 L of oxygen  Neck - supple,no enlarged lymph nodes, no JVD, no thyromegaly  Chest- CTA, no rhonchi, no crackles, diminished breath sounds at the right base  Heart- rrr, no murmurs / gallop/rub  Abdomen- soft,BS+,NT, no hepatosplenomegaly  Ext - no c/c/edema   Neuro- no focal deficits. Power 5/5 all extremities  Skin - warm,dry, no obvious rashes.           Review of Data:   LABS:   Lab Results   Component Value Date/Time    WBC 4.6 09/07/2022 12:44 PM    HGB 11.5 (L) 09/07/2022 12:44 PM    HCT 36.0 (L) 09/07/2022 12:44 PM    PLATELET 963 54/42/6102 12:44 PM     Lab Results   Component Value Date/Time    Sodium 140 09/07/2022 12:44 PM    Potassium 4.4 09/07/2022 12:44 PM    Chloride 105 09/07/2022 12:44 PM    CO2 32 09/07/2022 12:44 PM    Glucose 93 09/07/2022 12:44 PM    BUN 18 09/07/2022 12:44 PM    Creatinine 0.91 09/07/2022 12:44 PM     Lab Results   Component Value Date/Time    Cholesterol, total 141 09/07/2022 12:44 PM    HDL Cholesterol 49 09/07/2022 12:44 PM    LDL, calculated 62.4 09/07/2022 12:44 PM    Triglyceride 148 09/07/2022 12:44 PM     No components found for: GPT        Impression / Plan:        ICD-10-CM ICD-9-CM    1. Gastroesophageal reflux disease without esophagitis  K21.9 530.81       2. Hypercholesteremia  E78.00 272.0       3. Chronic obstructive pulmonary disease, unspecified COPD type ([facility]  J44.9 496 CT CHEST WO CONT      4. Essential tremor  G25.0 333.1       5. Mood swings  R45.86 799.24       6. Mantle cell lymphoma, unspecified body region ([facility]  C83.10 200.40 CT CHEST WO CONT      7. BARROS (dyspnea on exertion)  R06.09 786.09 CT CHEST WO CONT      8. Chronically elevated hemidiaphragm  J98.6 519.4 CT CHEST WO CONT      Hypercholesterolemia/GERD/tremors-stable    COPD-etiology unclear, patient has never smoked in his life. Right diaphragm is elevated. Informed patient that there are couple of possibilities why the diaphragm could be elevated. Differential diagnosis-subdiaphragmatic process, less likely, rule out endobronchial lesion causing decreased inflation of the right lung, diaphragmatic paralysis on the right side, less likely, no history of surgeries  Patient may need a bronchoscopy. Will inform patient after getting the CT scan results      Explained to patient risk benefits of the medications. Advised patient to stop meds if having any side effects. Pt verbalized understanding of the instructions. I have discussed the diagnosis with the patient and the intended plan as seen in the above orders. The patient has received an after-visit summary and questions were answered concerning future plans. I have discussed medication side effects and warnings with the patient as well. I have reviewed the plan of care with the patient, accepted their input and they are in agreement with the treatment goals. Reviewed plan of care. Patient has provided input and agrees with goals. Follow-up and Dispositions    Return in about 6 months (around 9/10/2023).          Kaye Jackson MD

## 2023-03-20 ENCOUNTER — HOSPITAL ENCOUNTER (OUTPATIENT)
Dept: CT IMAGING | Age: 68
Discharge: HOME | End: 2023-03-20
Attending: INTERNAL MEDICINE
Payer: MEDICARE

## 2023-03-20 DIAGNOSIS — C83.10 MANTLE CELL LYMPHOMA, UNSPECIFIED BODY REGION (HCC): ICD-10-CM

## 2023-03-20 DIAGNOSIS — J98.6 CHRONICALLY ELEVATED HEMIDIAPHRAGM: ICD-10-CM

## 2023-03-20 DIAGNOSIS — J44.9 CHRONIC OBSTRUCTIVE PULMONARY DISEASE, UNSPECIFIED COPD TYPE (HCC): ICD-10-CM

## 2023-03-20 DIAGNOSIS — R06.09 DOE (DYSPNEA ON EXERTION): ICD-10-CM

## 2023-03-20 PROCEDURE — 71250 CT THORAX DX C-: CPT

## 2023-03-21 NOTE — PROGRESS NOTES
CT scan of the chest shows elevation of the right hemidiaphragm. As per radiology could be due to paralysis of  right hemidiaphragm. Would recommend deep breathing exercises . however there are no tumors or masses in the right lung that is reassuring. there is a small nodule in the left lung. Repeat CT scan in 6 months. I have referred patient to pulmonary specialist.  Patient should discuss CT scan results   With pulmonary specialist    Please print and mail copy of the CT scan report to the     Patient.

## 2023-03-26 RX ORDER — TIOTROPIUM BROMIDE 18 UG/1
CAPSULE ORAL; RESPIRATORY (INHALATION)
Qty: 30 CAPSULE | Refills: 3 | Status: SHIPPED | OUTPATIENT
Start: 2023-03-26

## 2023-03-29 ENCOUNTER — TRANSCRIBE ORDER (OUTPATIENT)
Dept: SCHEDULING | Age: 68
End: 2023-03-29

## 2023-03-29 DIAGNOSIS — R93.89 ABNORMAL CHEST CT: Primary | ICD-10-CM

## 2023-03-30 ENCOUNTER — TRANSCRIBE ORDER (OUTPATIENT)
Dept: SCHEDULING | Age: 68
End: 2023-03-30

## 2023-04-23 DIAGNOSIS — R93.89 ABNORMAL CHEST CT: Primary | ICD-10-CM

## 2023-05-06 ENCOUNTER — TRANSCRIBE ORDERS (OUTPATIENT)
Facility: HOSPITAL | Age: 68
End: 2023-05-06

## 2023-05-06 DIAGNOSIS — R93.89 ABNORMAL CHEST CT: Primary | ICD-10-CM

## 2023-06-27 ENCOUNTER — HOSPITAL ENCOUNTER (OUTPATIENT)
Facility: HOSPITAL | Age: 68
Discharge: HOME | End: 2023-06-30
Payer: MEDICARE

## 2023-06-27 DIAGNOSIS — R93.89 ABNORMAL CHEST CT: ICD-10-CM

## 2023-06-27 PROCEDURE — 71250 CT THORAX DX C-: CPT

## 2023-08-10 ENCOUNTER — TRANSCRIBE ORDERS (OUTPATIENT)
Facility: HOSPITAL | Age: 68
End: 2023-08-10

## 2023-08-10 DIAGNOSIS — R93.89 ABNORMAL CHEST CT: ICD-10-CM

## 2023-08-10 DIAGNOSIS — R06.02 SHORTNESS OF BREATH: Primary | ICD-10-CM

## 2023-08-29 RX ORDER — TIOTROPIUM BROMIDE 18 UG/1
CAPSULE ORAL; RESPIRATORY (INHALATION)
Qty: 90 CAPSULE | Refills: 0 | Status: SHIPPED | OUTPATIENT
Start: 2023-08-29

## 2023-08-29 NOTE — TELEPHONE ENCOUNTER
PCP: Katia Doll MD    Last Visit 3/10/2023   Future Appointments   Date Time Provider [address]   9/11/2023 10:00 AM Katia Doll MD [facility] BS AMB   9/13/2023  8:15 AM [facility] ECHO LAB 1 [address] [place] [address]   9/13/2023  9:30 AM [facility]       Requested Prescriptions     Pending Prescriptions Disp Refills    SPIRIVA HANDIHALER 18 MCG inhalation capsule [Pharmacy Med Name: Heavenly Cook 18MCG CAPS 30S & HANDIHALER] 30 capsule      Sig: INHALE THE CONTENTS OF 1 CAPSULE VIA INHALATION DEVICE DAILY         Other Comments: Last Refill

## 2023-09-06 ENCOUNTER — TELEPHONE (OUTPATIENT)
Dept: PRIMARY CARE CLINIC | Facility: CLINIC | Age: 68
End: 2023-09-06

## 2023-09-06 DIAGNOSIS — K21.9 GASTRO-ESOPHAGEAL REFLUX DISEASE WITHOUT ESOPHAGITIS: Primary | ICD-10-CM

## 2023-09-06 RX ORDER — OMEPRAZOLE 40 MG/1
40 CAPSULE, DELAYED RELEASE ORAL DAILY
Qty: 90 CAPSULE | Refills: 0 | Status: SHIPPED | OUTPATIENT
Start: 2023-09-06

## 2023-09-08 RX ORDER — OMEPRAZOLE 40 MG/1
40 CAPSULE, DELAYED RELEASE ORAL DAILY
Qty: 90 CAPSULE | OUTPATIENT
Start: 2023-09-08

## 2023-09-11 ENCOUNTER — OFFICE VISIT (OUTPATIENT)
Dept: PRIMARY CARE CLINIC | Facility: CLINIC | Age: 68
End: 2023-09-11
Payer: MEDICARE

## 2023-09-11 VITALS
HEIGHT: 72 IN | DIASTOLIC BLOOD PRESSURE: 67 MMHG | RESPIRATION RATE: 16 BRPM | TEMPERATURE: 98 F | BODY MASS INDEX: 33.46 KG/M2 | SYSTOLIC BLOOD PRESSURE: 104 MMHG | HEART RATE: 60 BPM | OXYGEN SATURATION: 95 % | WEIGHT: 247 LBS

## 2023-09-11 DIAGNOSIS — G47.33 OSA ON CPAP: ICD-10-CM

## 2023-09-11 DIAGNOSIS — J44.9 CHRONIC OBSTRUCTIVE PULMONARY DISEASE, UNSPECIFIED COPD TYPE (HCC): Primary | ICD-10-CM

## 2023-09-11 DIAGNOSIS — Z99.89 OSA ON CPAP: ICD-10-CM

## 2023-09-11 DIAGNOSIS — G25.0 ESSENTIAL TREMOR: ICD-10-CM

## 2023-09-11 DIAGNOSIS — C83.10 MANTLE CELL LYMPHOMA, UNSPECIFIED BODY REGION (HCC): ICD-10-CM

## 2023-09-11 DIAGNOSIS — L30.9 DERMATITIS: ICD-10-CM

## 2023-09-11 PROCEDURE — 1123F ACP DISCUSS/DSCN MKR DOCD: CPT | Performed by: INTERNAL MEDICINE

## 2023-09-11 PROCEDURE — G8417 CALC BMI ABV UP PARAM F/U: HCPCS | Performed by: INTERNAL MEDICINE

## 2023-09-11 PROCEDURE — 4004F PT TOBACCO SCREEN RCVD TLK: CPT | Performed by: INTERNAL MEDICINE

## 2023-09-11 PROCEDURE — 3023F SPIROM DOC REV: CPT | Performed by: INTERNAL MEDICINE

## 2023-09-11 PROCEDURE — G8427 DOCREV CUR MEDS BY ELIG CLIN: HCPCS | Performed by: INTERNAL MEDICINE

## 2023-09-11 PROCEDURE — 99213 OFFICE O/P EST LOW 20 MIN: CPT | Performed by: INTERNAL MEDICINE

## 2023-09-11 PROCEDURE — 3017F COLORECTAL CA SCREEN DOC REV: CPT | Performed by: INTERNAL MEDICINE

## 2023-09-11 RX ORDER — CALCIPOTRIENE 50 UG/G
CREAM TOPICAL
Qty: 60 G | Refills: 4 | Status: SHIPPED | OUTPATIENT
Start: 2023-09-11

## 2023-09-11 SDOH — ECONOMIC STABILITY: HOUSING INSECURITY
IN THE LAST 12 MONTHS, WAS THERE A TIME WHEN YOU DID NOT HAVE A STEADY PLACE TO SLEEP OR SLEPT IN A SHELTER (INCLUDING NOW)?: PATIENT REFUSED

## 2023-09-11 SDOH — ECONOMIC STABILITY: INCOME INSECURITY: HOW HARD IS IT FOR YOU TO PAY FOR THE VERY BASICS LIKE FOOD, HOUSING, MEDICAL CARE, AND HEATING?: PATIENT DECLINED

## 2023-09-11 SDOH — ECONOMIC STABILITY: FOOD INSECURITY: WITHIN THE PAST 12 MONTHS, YOU WORRIED THAT YOUR FOOD WOULD RUN OUT BEFORE YOU GOT MONEY TO BUY MORE.: PATIENT DECLINED

## 2023-09-11 SDOH — ECONOMIC STABILITY: FOOD INSECURITY: WITHIN THE PAST 12 MONTHS, THE FOOD YOU BOUGHT JUST DIDN'T LAST AND YOU DIDN'T HAVE MONEY TO GET MORE.: PATIENT DECLINED

## 2023-09-11 ASSESSMENT — PATIENT HEALTH QUESTIONNAIRE - PHQ9
1. LITTLE INTEREST OR PLEASURE IN DOING THINGS: 0
2. FEELING DOWN, DEPRESSED OR HOPELESS: 0
SUM OF ALL RESPONSES TO PHQ QUESTIONS 1-9: 0
SUM OF ALL RESPONSES TO PHQ9 QUESTIONS 1 & 2: 0
SUM OF ALL RESPONSES TO PHQ QUESTIONS 1-9: 0

## 2023-09-13 ENCOUNTER — HOSPITAL ENCOUNTER (OUTPATIENT)
Facility: HOSPITAL | Age: 68
Discharge: HOME | End: 2023-09-16
Attending: INTERNAL MEDICINE
Payer: MEDICARE

## 2023-09-13 ENCOUNTER — HOSPITAL ENCOUNTER (OUTPATIENT)
Facility: HOSPITAL | Age: 68
Discharge: HOME | End: 2023-09-15
Attending: INTERNAL MEDICINE
Payer: MEDICARE

## 2023-09-13 VITALS
HEIGHT: 72 IN | BODY MASS INDEX: 33.44 KG/M2 | SYSTOLIC BLOOD PRESSURE: 104 MMHG | WEIGHT: 246.91 LBS | DIASTOLIC BLOOD PRESSURE: 67 MMHG

## 2023-09-13 DIAGNOSIS — R93.89 ABNORMAL CHEST CT: ICD-10-CM

## 2023-09-13 DIAGNOSIS — R06.02 SHORTNESS OF BREATH: ICD-10-CM

## 2023-09-13 LAB
ECHO AO ASC DIAM: 3.8 CM
ECHO AO ASCENDING AORTA INDEX: 1.64 CM/M2
ECHO AO ROOT DIAM: 4.1 CM
ECHO AO ROOT INDEX: 1.77 CM/M2
ECHO AV PEAK GRADIENT: 6 MMHG
ECHO AV PEAK VELOCITY: 1.3 M/S
ECHO AV VELOCITY RATIO: 0.85
ECHO BSA: 2.38 M2
ECHO EST RA PRESSURE: 15 MMHG
ECHO LA DIAMETER INDEX: 2.03 CM/M2
ECHO LA DIAMETER: 4.7 CM
ECHO LA TO AORTIC ROOT RATIO: 1.15
ECHO LA VOL 2C: 66 ML (ref 18–58)
ECHO LA VOL 2C: 68 ML (ref 18–58)
ECHO LA VOL 4C: 61 ML (ref 18–58)
ECHO LA VOL 4C: 64 ML (ref 18–58)
ECHO LA VOL BP: 64 ML (ref 18–58)
ECHO LA VOL/BSA BIPLANE: 28 ML/M2 (ref 16–34)
ECHO LA VOLUME AREA LENGTH: 67 ML
ECHO LA VOLUME INDEX AREA LENGTH: 29 ML/M2 (ref 16–34)
ECHO LV E' LATERAL VELOCITY: 7 CM/S
ECHO LV E' SEPTAL VELOCITY: 6 CM/S
ECHO LV FRACTIONAL SHORTENING: 32 % (ref 28–44)
ECHO LV INTERNAL DIMENSION DIASTOLE INDEX: 2.54 CM/M2
ECHO LV INTERNAL DIMENSION DIASTOLIC: 5.9 CM (ref 4.2–5.9)
ECHO LV INTERNAL DIMENSION SYSTOLIC INDEX: 1.72 CM/M2
ECHO LV INTERNAL DIMENSION SYSTOLIC: 4 CM
ECHO LV IVSD: 1.2 CM (ref 0.6–1)
ECHO LV MASS 2D: 288.5 G (ref 88–224)
ECHO LV MASS INDEX 2D: 124.3 G/M2 (ref 49–115)
ECHO LV POSTERIOR WALL DIASTOLIC: 1.1 CM (ref 0.6–1)
ECHO LV RELATIVE WALL THICKNESS RATIO: 0.37
ECHO LVOT PEAK GRADIENT: 5 MMHG
ECHO LVOT PEAK VELOCITY: 1.1 M/S
ECHO MV A VELOCITY: 0.88 M/S
ECHO MV AREA PHT: 2.5 CM2
ECHO MV E DECELERATION TIME (DT): 300.2 MS
ECHO MV E VELOCITY: 0.69 M/S
ECHO MV E/A RATIO: 0.78
ECHO MV E/E' LATERAL: 9.86
ECHO MV E/E' RATIO (AVERAGED): 10.68
ECHO MV E/E' SEPTAL: 11.5
ECHO MV PRESSURE HALF TIME (PHT): 87.1 MS
ECHO PV MAX VELOCITY: 1.2 M/S
ECHO PV PEAK GRADIENT: 6 MMHG
ECHO RIGHT VENTRICULAR SYSTOLIC PRESSURE (RVSP): 41 MMHG
ECHO RV TAPSE: 2.5 CM (ref 1.7–?)
ECHO TV REGURGITANT MAX VELOCITY: 2.54 M/S
ECHO TV REGURGITANT PEAK GRADIENT: 26 MMHG

## 2023-09-13 PROCEDURE — 76000 FLUOROSCOPY <1 HR PHYS/QHP: CPT

## 2023-09-13 PROCEDURE — 93306 TTE W/DOPPLER COMPLETE: CPT

## 2023-09-18 RX ORDER — AZELASTINE 1 MG/ML
SPRAY, METERED NASAL
Qty: 30 ML | Refills: 4 | Status: SHIPPED | OUTPATIENT
Start: 2023-09-18

## 2023-10-24 ENCOUNTER — OFFICE VISIT (OUTPATIENT)
Age: 68
End: 2023-10-24

## 2023-10-24 VITALS
WEIGHT: 252 LBS | TEMPERATURE: 98.1 F | DIASTOLIC BLOOD PRESSURE: 71 MMHG | SYSTOLIC BLOOD PRESSURE: 128 MMHG | HEART RATE: 56 BPM | RESPIRATION RATE: 18 BRPM | HEIGHT: 72 IN | BODY MASS INDEX: 34.13 KG/M2 | OXYGEN SATURATION: 96 %

## 2023-10-24 DIAGNOSIS — M25.511 ACUTE PAIN OF RIGHT SHOULDER: Primary | ICD-10-CM

## 2023-11-13 ENCOUNTER — TRANSCRIBE ORDERS (OUTPATIENT)
Facility: HOSPITAL | Age: 68
End: 2023-11-13

## 2023-11-13 DIAGNOSIS — M25.511 ARTHRALGIA OF RIGHT SHOULDER REGION: Primary | ICD-10-CM

## 2023-11-22 ENCOUNTER — HOSPITAL ENCOUNTER (OUTPATIENT)
Facility: HOSPITAL | Age: 68
Discharge: HOME | End: 2023-11-25
Attending: ORTHOPAEDIC SURGERY
Payer: MEDICARE

## 2023-11-22 DIAGNOSIS — M25.511 ACUTE PAIN OF RIGHT SHOULDER: ICD-10-CM

## 2023-11-22 PROCEDURE — 73221 MRI JOINT UPR EXTREM W/O DYE: CPT

## 2023-11-27 RX ORDER — TIOTROPIUM BROMIDE 18 UG/1
CAPSULE ORAL; RESPIRATORY (INHALATION)
Qty: 90 CAPSULE | Refills: 0 | Status: SHIPPED | OUTPATIENT
Start: 2023-11-27

## 2023-11-27 NOTE — TELEPHONE ENCOUNTER
PCP: Bushra Navarrete MD    Last Visit 9/11/2023   Future Appointments   Date Time Provider [address]   3/12/2024 11:15 AM Bushra Navarrete MD [facility] BS AMB       Requested Prescriptions     Pending Prescriptions Disp Refills    Bushra Navarrete 18 MCG inhalation capsule [Pharmacy Med Name: Henrique Flanagan 18MCG CAPS 30S & HANDIHALER] 90 capsule 0     Sig: INHALE THE CONTENTS OF 1 CAPSULE VIA INHALATION DEVICE DAILY         Other Comments: Last Refill   08/29/23

## 2023-11-30 ENCOUNTER — OFFICE VISIT (OUTPATIENT)
Age: 68
End: 2023-11-30

## 2023-11-30 ENCOUNTER — NURSE TRIAGE (OUTPATIENT)
Dept: OTHER | Facility: CLINIC | Age: 68
End: 2023-11-30

## 2023-11-30 VITALS
BODY MASS INDEX: 34.07 KG/M2 | OXYGEN SATURATION: 96 % | TEMPERATURE: 98.4 F | RESPIRATION RATE: 16 BRPM | SYSTOLIC BLOOD PRESSURE: 121 MMHG | DIASTOLIC BLOOD PRESSURE: 66 MMHG | HEART RATE: 67 BPM | WEIGHT: 248.8 LBS

## 2023-11-30 DIAGNOSIS — H65.02 NON-RECURRENT ACUTE SEROUS OTITIS MEDIA OF LEFT EAR: ICD-10-CM

## 2023-11-30 DIAGNOSIS — B96.89 ACUTE BACTERIAL SINUSITIS: Primary | ICD-10-CM

## 2023-11-30 DIAGNOSIS — J01.90 ACUTE BACTERIAL SINUSITIS: Primary | ICD-10-CM

## 2023-11-30 RX ORDER — AMOXICILLIN AND CLAVULANATE POTASSIUM 875; 125 MG/1; MG/1
1 TABLET, FILM COATED ORAL 2 TIMES DAILY
Qty: 14 TABLET | Refills: 0 | Status: SHIPPED | OUTPATIENT
Start: 2023-11-30 | End: 2023-12-07

## 2023-11-30 NOTE — TELEPHONE ENCOUNTER
[place]    Received call from [facility] at [employer] with [employer]. Subjective: Caller states \" Left ear pain and fullness- trouble hearing- pain increased this morning\"     Current Symptoms: phlegm (clear), coughing, home covid negative, ear pain, sinus pain     Onset: 5 days ago; gradual    Associated Symptoms: NA    Pain Severity: 4/10; sharp; moderate    Temperature: no fevers     What has been tried: dayquil, nyquil      Recommended disposition: See in Office Today    Care advice provided, patient verbalizes understanding; denies any other questions or concerns; instructed to call back for any new or worsening symptoms. Patient/Caller agrees with recommended disposition; writer provided warm transfer to Beth Quintero at [employer] for appointment scheduling    Attention Provider: Thank you for allowing me to participate in the care of your patient. The patient was connected to triage in response to information provided to the ECC/PSC. Please do not respond through this encounter as the response is not directed to a shared pool.         Reason for Disposition   Earache lasts > 1 hour    Protocols used: Ear - Congestion-ADULT-[place]

## 2023-11-30 NOTE — PROGRESS NOTES
EVERY EVENING 6/1/22  Yes Automatic Reconciliation, Ar   tamsulosin (FLOMAX) 0.4 MG capsule Take 2 capsules by mouth 2 times daily 8/8/22  Yes Automatic Reconciliation, Ar   triamcinolone (KENALOG) 0.025 % ointment Apply topically 2 times daily 9/7/22  Yes Automatic Reconciliation, Ar        Physical Exam  Vitals and nursing note reviewed. Constitutional:       Appearance: Normal appearance. HENT:      Head: Normocephalic and atraumatic. Right Ear: Tympanic membrane, ear canal and external ear normal.      Left Ear: Ear canal and external ear normal. A middle ear effusion (Serous) is present. Tympanic membrane is injected and erythematous. Nose: Nasal tenderness, mucosal edema and congestion present. No rhinorrhea. Right Sinus: Maxillary sinus tenderness present. No frontal sinus tenderness. Left Sinus: Maxillary sinus tenderness present. No frontal sinus tenderness. Mouth/Throat:      Mouth: Mucous membranes are moist.      Pharynx: Oropharynx is clear. No oropharyngeal exudate or posterior oropharyngeal erythema. Eyes:      Conjunctiva/sclera: Conjunctivae normal.   Cardiovascular:      Rate and Rhythm: Normal rate and regular rhythm. Heart sounds: Normal heart sounds. Pulmonary:      Effort: Pulmonary effort is normal.      Breath sounds: Normal breath sounds. Musculoskeletal:      Cervical back: Normal range of motion and neck supple. Lymphadenopathy:      Cervical: No cervical adenopathy. Skin:     General: Skin is warm and dry. Neurological:      Mental Status: He is alert and oriented to person, place, and time. Psychiatric:         Mood and Affect: Mood normal.         Behavior: Behavior normal.           An electronic signature was used to authenticate this note.     --Haritha Gray, APRN - CNP

## 2023-12-04 RX ORDER — TIOTROPIUM BROMIDE 18 UG/1
CAPSULE ORAL; RESPIRATORY (INHALATION)
Qty: 90 CAPSULE | Refills: 0 | OUTPATIENT
Start: 2023-12-04

## 2023-12-07 ENCOUNTER — OFFICE VISIT (OUTPATIENT)
Age: 68
End: 2023-12-07

## 2023-12-07 VITALS
SYSTOLIC BLOOD PRESSURE: 108 MMHG | HEIGHT: 72 IN | DIASTOLIC BLOOD PRESSURE: 70 MMHG | RESPIRATION RATE: 18 BRPM | HEART RATE: 82 BPM | TEMPERATURE: 98.4 F | WEIGHT: 254 LBS | OXYGEN SATURATION: 96 % | BODY MASS INDEX: 34.4 KG/M2

## 2023-12-07 DIAGNOSIS — H61.22 CERUMEN DEBRIS ON TYMPANIC MEMBRANE OF LEFT EAR: Primary | ICD-10-CM

## 2023-12-09 RX ORDER — OMEPRAZOLE 40 MG/1
40 CAPSULE, DELAYED RELEASE ORAL DAILY
Qty: 90 CAPSULE | Refills: 0 | Status: SHIPPED | OUTPATIENT
Start: 2023-12-09

## 2024-01-24 ENCOUNTER — TELEPHONE (OUTPATIENT)
Dept: PRIMARY CARE CLINIC | Facility: CLINIC | Age: 69
End: 2024-01-24

## 2024-01-24 NOTE — TELEPHONE ENCOUNTER
Patient reports that they fell twice yesterday. They are now experiencing \"severe back pain\" and difficulty walking or \"doing anything.\"    Patient asked for an appt today 1/24, but I informed them that we have stopped seeing patients for the day. I offered an appt on 1/30, but they hesitated and asked if the ER would be more appropriate. I told them I would have clinical staff call back with care options.    Phone: [phone]

## 2024-01-25 NOTE — TELEPHONE ENCOUNTER
Called patient and left a voicemail stating that we can either see him today at 1145 for back pain or he ca go to the ER per she for severe back pain and falling

## 2024-02-22 RX ORDER — ATORVASTATIN CALCIUM 20 MG/1
20 TABLET, FILM COATED ORAL DAILY
Qty: 90 TABLET | Refills: 0 | Status: SHIPPED | OUTPATIENT
Start: 2024-02-22

## 2024-02-22 NOTE — TELEPHONE ENCOUNTER
Patient calling to get refill of his atorvastatin sent to the [id number] - [place], [place] - [address] - P [phone] - F [phone] [[id number]]

## 2024-03-01 ENCOUNTER — HOSPITAL ENCOUNTER (OUTPATIENT)
Facility: HOSPITAL | Age: 69
End: 2024-03-01
Attending: INTERNAL MEDICINE
Payer: MEDICARE

## 2024-03-01 VITALS
WEIGHT: 253.97 LBS | SYSTOLIC BLOOD PRESSURE: 108 MMHG | DIASTOLIC BLOOD PRESSURE: 70 MMHG | HEIGHT: 72 IN | BODY MASS INDEX: 34.4 KG/M2

## 2024-03-01 DIAGNOSIS — I27.20 PULMONARY HYPERTENSION (HCC): ICD-10-CM

## 2024-03-01 LAB
ECHO AO ASC DIAM: 3.6 CM
ECHO AO ASCENDING AORTA INDEX: 1.53 CM/M2
ECHO AO ROOT DIAM: 3.9 CM
ECHO AO ROOT INDEX: 1.66 CM/M2
ECHO AV AREA PEAK VELOCITY: 2.7 CM2
ECHO AV AREA/BSA PEAK VELOCITY: 1.1 CM2/M2
ECHO AV PEAK GRADIENT: 4 MMHG
ECHO AV PEAK VELOCITY: 0.9 M/S
ECHO AV VELOCITY RATIO: 0.89
ECHO BSA: 2.41 M2
ECHO EST RA PRESSURE: 15 MMHG
ECHO LA DIAMETER INDEX: 2 CM/M2
ECHO LA DIAMETER: 4.7 CM
ECHO LA TO AORTIC ROOT RATIO: 1.21
ECHO LA VOL A-L A4C: 68 ML (ref 18–58)
ECHO LA VOL MOD A4C: 64 ML (ref 18–58)
ECHO LA VOLUME INDEX A-L A4C: 29 ML/M2 (ref 16–34)
ECHO LA VOLUME INDEX MOD A4C: 27 ML/M2 (ref 16–34)
ECHO LV E' LATERAL VELOCITY: 13 CM/S
ECHO LV E' SEPTAL VELOCITY: 6 CM/S
ECHO LV FRACTIONAL SHORTENING: 28 % (ref 28–44)
ECHO LV INTERNAL DIMENSION DIASTOLE INDEX: 1.7 CM/M2
ECHO LV INTERNAL DIMENSION DIASTOLIC: 4 CM (ref 4.2–5.9)
ECHO LV INTERNAL DIMENSION SYSTOLIC INDEX: 1.23 CM/M2
ECHO LV INTERNAL DIMENSION SYSTOLIC: 2.9 CM
ECHO LV IVSD: 1.6 CM (ref 0.6–1)
ECHO LV MASS 2D: 271 G (ref 88–224)
ECHO LV MASS INDEX 2D: 115.3 G/M2 (ref 49–115)
ECHO LV POSTERIOR WALL DIASTOLIC: 1.7 CM (ref 0.6–1)
ECHO LV RELATIVE WALL THICKNESS RATIO: 0.85
ECHO LVOT AREA: 3.1 CM2
ECHO LVOT DIAM: 2 CM
ECHO LVOT PEAK GRADIENT: 2 MMHG
ECHO LVOT PEAK VELOCITY: 0.8 M/S
ECHO MV AREA PHT: 4.1 CM2
ECHO MV E DECELERATION TIME (DT): 183.7 MS
ECHO MV E VELOCITY: 0.93 M/S
ECHO MV E/E' LATERAL: 7.15
ECHO MV E/E' RATIO (AVERAGED): 11.33
ECHO MV PRESSURE HALF TIME (PHT): 53.3 MS
ECHO MV REGURGITANT PEAK GRADIENT: 12 MMHG
ECHO MV REGURGITANT PEAK VELOCITY: 1.7 M/S
ECHO PV MAX VELOCITY: 1.1 M/S
ECHO PV PEAK GRADIENT: 4 MMHG
ECHO RIGHT VENTRICULAR SYSTOLIC PRESSURE (RVSP): 40 MMHG
ECHO RV FREE WALL PEAK S': 11 CM/S
ECHO RV TAPSE: 1.3 CM (ref 1.7–?)
ECHO TV REGURGITANT MAX VELOCITY: 2.51 M/S
ECHO TV REGURGITANT PEAK GRADIENT: 25 MMHG

## 2024-03-01 PROCEDURE — 93306 TTE W/DOPPLER COMPLETE: CPT

## 2024-03-07 RX ORDER — OMEPRAZOLE 40 MG/1
40 CAPSULE, DELAYED RELEASE ORAL DAILY
Qty: 90 CAPSULE | Refills: 0 | Status: SHIPPED | OUTPATIENT
Start: 2024-03-07

## 2024-03-12 ENCOUNTER — OFFICE VISIT (OUTPATIENT)
Dept: PRIMARY CARE CLINIC | Facility: CLINIC | Age: 69
End: 2024-03-12
Payer: MEDICARE

## 2024-03-12 VITALS
OXYGEN SATURATION: 96 % | RESPIRATION RATE: 18 BRPM | DIASTOLIC BLOOD PRESSURE: 67 MMHG | BODY MASS INDEX: 34.72 KG/M2 | HEIGHT: 71 IN | HEART RATE: 64 BPM | TEMPERATURE: 98 F | WEIGHT: 248 LBS | SYSTOLIC BLOOD PRESSURE: 111 MMHG

## 2024-03-12 DIAGNOSIS — C83.10 MANTLE CELL LYMPHOMA, UNSPECIFIED BODY REGION (HCC): ICD-10-CM

## 2024-03-12 DIAGNOSIS — Z12.5 PROSTATE CANCER SCREENING: ICD-10-CM

## 2024-03-12 DIAGNOSIS — E78.00 HYPERCHOLESTEREMIA: ICD-10-CM

## 2024-03-12 DIAGNOSIS — E55.9 VITAMIN D DEFICIENCY: ICD-10-CM

## 2024-03-12 DIAGNOSIS — Z12.11 COLON CANCER SCREENING: ICD-10-CM

## 2024-03-12 DIAGNOSIS — J44.9 CHRONIC OBSTRUCTIVE PULMONARY DISEASE, UNSPECIFIED COPD TYPE (HCC): ICD-10-CM
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
## 2024-03-12 DIAGNOSIS — G20.A1 PARKINSON'S DISEASE WITHOUT FLUCTUATING MANIFESTATIONS, UNSPECIFIED WHETHER DYSKINESIA PRESENT (HCC): ICD-10-CM

## 2024-03-12 DIAGNOSIS — R73.09 ELEVATED GLUCOSE: ICD-10-CM

## 2024-03-12 DIAGNOSIS — Z97.4 HEARING AID WORN: ICD-10-CM

## 2024-03-12 DIAGNOSIS — L30.9 DERMATITIS: ICD-10-CM

## 2024-03-12 DIAGNOSIS — I48.91 ATRIAL FIBRILLATION, UNSPECIFIED TYPE (HCC): ICD-10-CM

## 2024-03-12 DIAGNOSIS — Z00.00 MEDICARE ANNUAL WELLNESS VISIT, INITIAL: Primary | ICD-10-CM

## 2024-03-12 DIAGNOSIS — G47.33 OSA ON CPAP: ICD-10-CM

## 2024-03-12 DIAGNOSIS — E78.00 PURE HYPERCHOLESTEROLEMIA, UNSPECIFIED: ICD-10-CM

## 2024-03-12 DIAGNOSIS — G25.0 ESSENTIAL TREMOR: ICD-10-CM

## 2024-03-12 PROCEDURE — 3017F COLORECTAL CA SCREEN DOC REV: CPT | Performed by: INTERNAL MEDICINE

## 2024-03-12 PROCEDURE — G8427 DOCREV CUR MEDS BY ELIG CLIN: HCPCS | Performed by: INTERNAL MEDICINE

## 2024-03-12 PROCEDURE — 99213 OFFICE O/P EST LOW 20 MIN: CPT | Performed by: INTERNAL MEDICINE

## 2024-03-12 PROCEDURE — 1123F ACP DISCUSS/DSCN MKR DOCD: CPT | Performed by: INTERNAL MEDICINE

## 2024-03-12 PROCEDURE — G8484 FLU IMMUNIZE NO ADMIN: HCPCS | Performed by: INTERNAL MEDICINE

## 2024-03-12 PROCEDURE — G0439 PPPS, SUBSEQ VISIT: HCPCS | Performed by: INTERNAL MEDICINE

## 2024-03-12 PROCEDURE — 3023F SPIROM DOC REV: CPT | Performed by: INTERNAL MEDICINE

## 2024-03-12 PROCEDURE — 1036F TOBACCO NON-USER: CPT | Performed by: INTERNAL MEDICINE

## 2024-03-12 PROCEDURE — G8417 CALC BMI ABV UP PARAM F/U: HCPCS | Performed by: INTERNAL MEDICINE

## 2024-03-12 RX ORDER — CALCIPOTRIENE 50 UG/G
CREAM TOPICAL
Qty: 120 G | Refills: 4 | Status: SHIPPED | OUTPATIENT
Start: 2024-03-12

## 2024-03-12 RX ORDER — APIXABAN 5 MG/1
5 TABLET, FILM COATED ORAL 2 TIMES DAILY
COMMUNITY
Start: 2024-03-04

## 2024-03-12 ASSESSMENT — PATIENT HEALTH QUESTIONNAIRE - PHQ9
SUM OF ALL RESPONSES TO PHQ QUESTIONS 1-9: 0
SUM OF ALL RESPONSES TO PHQ QUESTIONS 1-9: 0
SUM OF ALL RESPONSES TO PHQ9 QUESTIONS 1 & 2: 0
2. FEELING DOWN, DEPRESSED OR HOPELESS: NOT AT ALL
SUM OF ALL RESPONSES TO PHQ QUESTIONS 1-9: 0
SUM OF ALL RESPONSES TO PHQ QUESTIONS 1-9: 0
1. LITTLE INTEREST OR PLEASURE IN DOING THINGS: NOT AT ALL

## 2024-03-12 ASSESSMENT — LIFESTYLE VARIABLES
HOW OFTEN DO YOU HAVE A DRINK CONTAINING ALCOHOL: NEVER
HOW MANY STANDARD DRINKS CONTAINING ALCOHOL DO YOU HAVE ON A TYPICAL DAY: PATIENT DOES NOT DRINK

## 2024-03-12 NOTE — PROGRESS NOTES
Partha Crowe is a 68 y.o.  male and presents with     Chief Complaint   Patient presents with    Medicare AWV     Pt saw Dr Styles. Was getting procedure done - was found to be in Afib.  Tremors were felt to be Parkisons- on it for a week , so far has not noticed improvement  Was placed on Elquis for Afib  Sinement was making him sleepy  Has appt with Neurologist  On 2 liters of oxygen.  Had a fall and had compression fracture 2 months ago and is currently getting PT  Not using walker      Past Medical History:   Diagnosis Date    Atrial fibrillation (HCC)     Parkinson disease      Past Surgical History:   Procedure Laterality Date    FRACTURE SURGERY       Current Outpatient Medications   Medication Sig    ELIQUIS 5 MG TABS tablet Take 1 tablet by mouth 2 times daily    carbidopa-levodopa (SINEMET)  MG per tablet Take 1 tablet by mouth 3 times daily    calcipotriene (DOVONEX) 0.005 % cream Apply topically 2 times daily.    Misc. Devices (ROLLER WALKER) MISC 1 each by Does not apply route daily    omeprazole (PRILOSEC) 40 MG delayed release capsule TAKE 1 CAPSULE BY MOUTH DAILY    atorvastatin (LIPITOR) 20 MG tablet Take 1 tablet by mouth daily    SPIRIVA HANDIHALER 18 MCG inhalation capsule INHALE THE CONTENTS OF 1 CAPSULE VIA INHALATION DEVICE DAILY    diclofenac sodium (VOLTAREN) 1 % GEL Apply 4 g topically 4 times daily    azelastine (ASTELIN) 0.1 % nasal spray USE 1 SPRAY IN EACH NOSTRIL TWICE DAILY AS DIRECTED    albuterol (PROVENTIL) (2.5 MG/3ML) 0.083% nebulizer solution USE 1 VIAL VIA NEBULIZER EVERY 4 TO 6 HOURS    benzonatate (TESSALON) 100 MG capsule Take 1 capsule by mouth 3 times daily as needed    citalopram (CELEXA) 20 MG tablet TAKE 1 TABLET BY MOUTH EVERY DAY AT 7 PM    clonazePAM (KLONOPIN) 0.5 MG tablet TAKE 1 TABLET BY MOUTH EVERY NIGHT AS NEEDED FOR ANXIETY. MAX DAILY AMOUNT: 0.5 MG    doxepin (SINEQUAN) 10 MG capsule Take 1 capsule by mouth nightly    fexofenadine (ALLEGRA) 180 MG

## 2024-03-12 NOTE — PROGRESS NOTES
\"Have you been to the ER, urgent care clinic since your last visit?  Hospitalized since your last visit?\"    NO    “Have you seen or consulted any other health care providers outside of [facility] since your last visit?”    NO    “Have you had a colorectal cancer screening such as a colonoscopy/FIT/Cologuard?    NO

## 2024-03-13 LAB
25(OH)D3 SERPL-MCNC: 28.5 NG/ML (ref 30–100)
CHOLEST SERPL-MCNC: 156 MG/DL
EST. AVERAGE GLUCOSE BLD GHB EST-MCNC: 105 MG/DL
HBA1C MFR BLD: 5.3 % (ref 4–5.6)
HDLC SERPL-MCNC: 58 MG/DL
HDLC SERPL: 2.7 (ref 0–5)
LDLC SERPL CALC-MCNC: 67.2 MG/DL (ref 0–100)
PSA SERPL-MCNC: 0 NG/ML (ref 0.01–4)
TRIGL SERPL-MCNC: 154 MG/DL
VLDLC SERPL CALC-MCNC: 30.8 MG/DL

## 2024-03-19 ENCOUNTER — TELEPHONE (OUTPATIENT)
Dept: PRIMARY CARE CLINIC | Facility: CLINIC | Age: 69
End: 2024-03-19

## 2024-03-19 NOTE — TELEPHONE ENCOUNTER
Submitted PA through Cover My Meds on 3/19/2024, KEY [id number] for Calcipotriene 0.005% cream. 120 qty.

## 2024-03-19 NOTE — TELEPHONE ENCOUNTER
[employer] insurance under Medicare part D benefit for Calcipotriene 0.005% cream was denied.    Insurance will cover:  A) MOMETASONE CREAM  B)  HYDROCORTISONE CREAM

## 2024-03-20 DIAGNOSIS — L30.9 DERMATITIS: Primary | ICD-10-CM

## 2024-03-20 RX ORDER — MOMETASONE FUROATE 1 MG/G
CREAM TOPICAL
Qty: 45 G | Refills: 3 | Status: SHIPPED | OUTPATIENT
Start: 2024-03-20

## 2024-03-27 LAB — NONINV COLON CA DNA+OCC BLD SCRN STL QL: NEGATIVE

## 2024-04-03 RX ORDER — TRIAMCINOLONE ACETONIDE 0.25 MG/G
OINTMENT TOPICAL
Qty: 30 G | Refills: 5 | Status: SHIPPED | OUTPATIENT
Start: 2024-04-03

## 2024-04-03 NOTE — TELEPHONE ENCOUNTER
PCP: Nate Iraheta MD    Last Visit 3/12/2024   Future Appointments   Date Time Provider Department Center   9/12/2024 11:00 AM Nate Iraheta MD [facility] AMB       Requested Prescriptions     Pending Prescriptions Disp Refills    triamcinolone (KENALOG) 0.025 % ointment [Pharmacy Med Name: TRIAMCINOLONE 0.025% OINTMENT 15GM] 30 g      Sig: APPLY TOPICALLY TO THE AFFECTED AREA TWICE DAILY         Other Comments: Last Refill   04/07/23

## 2024-05-23 RX ORDER — ATORVASTATIN CALCIUM 20 MG/1
20 TABLET, FILM COATED ORAL DAILY
Qty: 90 TABLET | Refills: 1 | Status: SHIPPED | OUTPATIENT
Start: 2024-05-23

## 2024-06-04 ENCOUNTER — TELEPHONE (OUTPATIENT)
Dept: PRIMARY CARE CLINIC | Facility: CLINIC | Age: 69
End: 2024-06-04

## 2024-06-04 RX ORDER — OMEPRAZOLE 40 MG/1
40 CAPSULE, DELAYED RELEASE ORAL DAILY
Qty: 90 CAPSULE | Refills: 0 | Status: SHIPPED | OUTPATIENT
Start: 2024-06-04

## 2024-06-04 NOTE — TELEPHONE ENCOUNTER
PCP: Nate Iraheta MD    Last Visit 3/12/2024   Future Appointments   Date Time Provider Department Center   9/12/2024 11:00 AM Nate Iraheta MD [facility] BS AMB       Requested Prescriptions     Pending Prescriptions Disp Refills    omeprazole (PRILOSEC) 40 MG delayed release capsule [Pharmacy Med Name: OMEPRAZOLE 40MG CAPSULES] 90 capsule 0     Sig: TAKE 1 CAPSULE BY MOUTH DAILY         Other Comments: Last Refill   3/07/24

## 2024-06-04 NOTE — TELEPHONE ENCOUNTER
[facility] to see if Dr. Iraheta will follow orders for the patient for home health physical and occupational therapy. Please call [phone]

## 2024-06-06 ENCOUNTER — TELEPHONE (OUTPATIENT)
Dept: PRIMARY CARE CLINIC | Facility: CLINIC | Age: 69
End: 2024-06-06

## 2024-06-06 NOTE — TELEPHONE ENCOUNTER
Patient was in the hospital after fracturing their back and was discharged yesterday. Cinthia from [facility] wants to know if we will follow and sign for home care services.    [facility] called on 6/4, but the rep/phone number is different.    Cinthia phone: [phone]

## 2024-06-10 RX ORDER — AZELASTINE 1 MG/ML
SPRAY, METERED NASAL
Qty: 30 ML | Refills: 4 | Status: SHIPPED | OUTPATIENT
Start: 2024-06-10

## 2024-06-20 ENCOUNTER — HOSPITAL ENCOUNTER (OUTPATIENT)
Facility: HOSPITAL | Age: 69
Discharge: HOME | End: 2024-06-20
Payer: MEDICARE

## 2024-06-20 ENCOUNTER — OFFICE VISIT (OUTPATIENT)
Dept: PRIMARY CARE CLINIC | Facility: CLINIC | Age: 69
End: 2024-06-20
Payer: MEDICARE

## 2024-06-20 VITALS
HEART RATE: 66 BPM | HEIGHT: 71 IN | OXYGEN SATURATION: 95 % | TEMPERATURE: 97.8 F | WEIGHT: 247 LBS | SYSTOLIC BLOOD PRESSURE: 155 MMHG | DIASTOLIC BLOOD PRESSURE: 70 MMHG | RESPIRATION RATE: 18 BRPM | BODY MASS INDEX: 34.58 KG/M2

## 2024-06-20 DIAGNOSIS — Z98.890 S/P KYPHOPLASTY: ICD-10-CM

## 2024-06-20 DIAGNOSIS — J44.9 CHRONIC OBSTRUCTIVE PULMONARY DISEASE, UNSPECIFIED COPD TYPE (HCC): ICD-10-CM

## 2024-06-20 DIAGNOSIS — R09.89 SCATTERED RESPIRATORY CRACKLES OF LEFT LUNG: ICD-10-CM

## 2024-06-20 DIAGNOSIS — I48.91 ATRIAL FIBRILLATION, UNSPECIFIED TYPE (HCC): ICD-10-CM

## 2024-06-20 DIAGNOSIS — E78.00 HYPERCHOLESTEREMIA: ICD-10-CM

## 2024-06-20 DIAGNOSIS — R23.1 PALLOR: ICD-10-CM

## 2024-06-20 DIAGNOSIS — G20.A1 PARKINSON'S DISEASE WITHOUT FLUCTUATING MANIFESTATIONS, UNSPECIFIED WHETHER DYSKINESIA PRESENT (HCC): ICD-10-CM

## 2024-06-20 DIAGNOSIS — S32.010A COMPRESSION FRACTURE OF L1 VERTEBRA, INITIAL ENCOUNTER (HCC): ICD-10-CM

## 2024-06-20 DIAGNOSIS — D64.9 ANEMIA, UNSPECIFIED TYPE: ICD-10-CM

## 2024-06-20 DIAGNOSIS — C83.10 MANTLE CELL LYMPHOMA, UNSPECIFIED BODY REGION (HCC): Primary | ICD-10-CM

## 2024-06-20 PROCEDURE — 1123F ACP DISCUSS/DSCN MKR DOCD: CPT | Performed by: INTERNAL MEDICINE

## 2024-06-20 PROCEDURE — 3017F COLORECTAL CA SCREEN DOC REV: CPT | Performed by: INTERNAL MEDICINE

## 2024-06-20 PROCEDURE — 1036F TOBACCO NON-USER: CPT | Performed by: INTERNAL MEDICINE

## 2024-06-20 PROCEDURE — G8427 DOCREV CUR MEDS BY ELIG CLIN: HCPCS | Performed by: INTERNAL MEDICINE

## 2024-06-20 PROCEDURE — 99214 OFFICE O/P EST MOD 30 MIN: CPT | Performed by: INTERNAL MEDICINE

## 2024-06-20 PROCEDURE — G8417 CALC BMI ABV UP PARAM F/U: HCPCS | Performed by: INTERNAL MEDICINE

## 2024-06-20 PROCEDURE — 3023F SPIROM DOC REV: CPT | Performed by: INTERNAL MEDICINE

## 2024-06-20 PROCEDURE — 71046 X-RAY EXAM CHEST 2 VIEWS: CPT

## 2024-06-20 RX ORDER — FERROUS SULFATE 325(65) MG
325 TABLET ORAL 2 TIMES DAILY
Qty: 180 TABLET | Refills: 1 | Status: SHIPPED | OUTPATIENT
Start: 2024-06-20

## 2024-06-20 NOTE — PROGRESS NOTES
Health Decision Maker has been checked with the patient   Primary Decision Maker: Kecia Crowe - [facility] - [phone]     AI form was signed    Chief Complaint   Patient presents with    Follow-up       \"Have you been to the ER, urgent care clinic since your last visit?  Hospitalized since your last visit?\"    YES - When: approximately 4  weeks ago.  Where and Why: [facility]    “Have you seen or consulted any other health care providers outside of [facility] since your last visit?”    NO      There were no vitals filed for this visit.   Depression: Not at risk (3/12/2024)    PHQ-2     PHQ-2 Score: 0              Click Here for Release of Records Request    Chart reviewed: immunizations are documented.   Immunization History   Administered Date(s) Administered    Zoster Recombinant (Shingrix) 03/02/2022

## 2024-06-20 NOTE — PROGRESS NOTES
Partha Crowe is a 68 y.o. male and presents with     Chief Complaint   Patient presents with    Follow-up       History of Present Illness  The patient presents for evaluation of multiple medical concerns. He is accompanied by an adult female.    The patient underwent a kyphoplasty procedure on his lower back on Monday, following a 5-day hospital stay due to a compression fracture. He reports an improvement in his condition post-surgery. The accompanying adult female inquires about the timeline for the removal of the brace. The patient is currently participating in the BLT program, as advised by his therapist, who advised against bending and pushing activities.    The patient underwent a focused ultrasound procedure on his left arm and left side of his brain to manage tremors in both arms. This procedure was performed by Dr. Styles at [facility], who recommended another procedure on the other side seven months later. The patient reports that this procedure has significantly alleviated the tremors on the right side. He denies any blood loss during the surgery. He utilizes a CPAP machine and a nebulizer.         Past Medical History:   Diagnosis Date    Atrial fibrillation (HCC)     Parkinson disease (HCC)      Past Surgical History:   Procedure Laterality Date    FRACTURE SURGERY       Current Outpatient Medications   Medication Sig    ferrous sulfate (IRON 325) 325 (65 Fe) MG tablet Take 1 tablet by mouth 2 times daily    azelastine (ASTELIN) 0.1 % nasal spray USE 1 SPRAY IN EACH NOSTRIL TWICE DAILY AS DIRECTED    omeprazole (PRILOSEC) 40 MG delayed release capsule TAKE 1 CAPSULE BY MOUTH DAILY    atorvastatin (LIPITOR) 20 MG tablet TAKE 1 TABLET BY MOUTH DAILY    triamcinolone (KENALOG) 0.025 % ointment APPLY TOPICALLY TO THE AFFECTED AREA TWICE DAILY    mometasone (ELOCON) 0.1 % cream Apply topically daily.    ELIQUIS 5 MG TABS tablet Take 1 tablet by mouth 2 times daily    carbidopa-levodopa (SINEMET)

## 2024-06-21 ENCOUNTER — TELEPHONE (OUTPATIENT)
Dept: PRIMARY CARE CLINIC | Facility: CLINIC | Age: 69
End: 2024-06-21

## 2024-06-21 NOTE — TELEPHONE ENCOUNTER
Patient called in and I discussed his chest XR results with him. He had questions as the message from PCP was not clear to me or the patient.    Will ask the doctor if there is anything the patient should be concerned about or put it in simpler terms..

## 2024-06-28 ENCOUNTER — TELEPHONE (OUTPATIENT)
Dept: PRIMARY CARE CLINIC | Facility: CLINIC | Age: 69
End: 2024-06-28

## 2024-06-28 NOTE — TELEPHONE ENCOUNTER
Patient has been wearing a back brace for a couple of weeks since his accident and he wants to know how long he should wear it?

## 2024-06-28 NOTE — TELEPHONE ENCOUNTER
Patient aware to call the hospital where he received the back brace or schedule an office appointment to discuss back brace.

## 2024-07-08 ENCOUNTER — HOSPITAL ENCOUNTER (OUTPATIENT)
Facility: HOSPITAL | Age: 69
Discharge: HOME | End: 2024-07-11
Payer: MEDICARE

## 2024-07-08 VITALS — HEIGHT: 72 IN | WEIGHT: 240 LBS | BODY MASS INDEX: 32.51 KG/M2

## 2024-07-08 DIAGNOSIS — S32.010A COMPRESSION FRACTURE OF L1 VERTEBRA, INITIAL ENCOUNTER (HCC): ICD-10-CM

## 2024-07-08 PROCEDURE — 77080 DXA BONE DENSITY AXIAL: CPT

## 2024-07-12 ENCOUNTER — TELEPHONE (OUTPATIENT)
Dept: PRIMARY CARE CLINIC | Facility: CLINIC | Age: 69
End: 2024-07-12

## 2024-07-12 NOTE — TELEPHONE ENCOUNTER
Dash shelton PT from [facility] wanted to talk to someone about the patient statics at discharge. She mentioned his blood pressure was running low.  [phone]

## 2024-07-12 NOTE — TELEPHONE ENCOUNTER
Returned [facility] call.  She stated that the patient was discharged from home health on 7/10  He has been walking independently very well  He is very independent in home.  He had an ablasion last week and said everything went amazing and that the patient is feeling great.  His oxygen levels are between 94%-97%    His wife stated that his diastolic numbers on his BP readings have been low, in the 55-60 range but patient has 0 complaints.

## 2024-09-03 RX ORDER — OMEPRAZOLE 40 MG/1
40 CAPSULE, DELAYED RELEASE ORAL DAILY
Qty: 90 CAPSULE | Refills: 0 | Status: SHIPPED | OUTPATIENT
Start: 2024-09-03

## 2024-09-12 ENCOUNTER — OFFICE VISIT (OUTPATIENT)
Dept: PRIMARY CARE CLINIC | Facility: CLINIC | Age: 69
End: 2024-09-12
Payer: MEDICARE

## 2024-09-12 VITALS
SYSTOLIC BLOOD PRESSURE: 105 MMHG | OXYGEN SATURATION: 94 % | HEIGHT: 72 IN | HEART RATE: 69 BPM | TEMPERATURE: 98 F | DIASTOLIC BLOOD PRESSURE: 63 MMHG | WEIGHT: 248 LBS | RESPIRATION RATE: 18 BRPM | BODY MASS INDEX: 33.59 KG/M2

## 2024-09-12 DIAGNOSIS — C83.10 MANTLE CELL LYMPHOMA, UNSPECIFIED BODY REGION (HCC): Primary | ICD-10-CM

## 2024-09-12 DIAGNOSIS — I48.91 ATRIAL FIBRILLATION, UNSPECIFIED TYPE (HCC): ICD-10-CM

## 2024-09-12 DIAGNOSIS — J44.9 CHRONIC OBSTRUCTIVE PULMONARY DISEASE, UNSPECIFIED COPD TYPE (HCC): ICD-10-CM

## 2024-09-12 DIAGNOSIS — G47.33 OSA ON CPAP: ICD-10-CM

## 2024-09-12 DIAGNOSIS — G25.0 ESSENTIAL TREMOR: ICD-10-CM

## 2024-09-12 DIAGNOSIS — E78.00 PURE HYPERCHOLESTEROLEMIA, UNSPECIFIED: ICD-10-CM

## 2024-09-12 DIAGNOSIS — E78.00 HYPERCHOLESTEREMIA: ICD-10-CM

## 2024-09-12 DIAGNOSIS — L30.9 DERMATITIS: ICD-10-CM

## 2024-09-12 PROCEDURE — 1036F TOBACCO NON-USER: CPT | Performed by: INTERNAL MEDICINE

## 2024-09-12 PROCEDURE — 1123F ACP DISCUSS/DSCN MKR DOCD: CPT | Performed by: INTERNAL MEDICINE

## 2024-09-12 PROCEDURE — 3023F SPIROM DOC REV: CPT | Performed by: INTERNAL MEDICINE

## 2024-09-12 PROCEDURE — 99214 OFFICE O/P EST MOD 30 MIN: CPT | Performed by: INTERNAL MEDICINE

## 2024-09-12 PROCEDURE — 3017F COLORECTAL CA SCREEN DOC REV: CPT | Performed by: INTERNAL MEDICINE

## 2024-09-12 PROCEDURE — G8417 CALC BMI ABV UP PARAM F/U: HCPCS | Performed by: INTERNAL MEDICINE

## 2024-09-12 PROCEDURE — G8427 DOCREV CUR MEDS BY ELIG CLIN: HCPCS | Performed by: INTERNAL MEDICINE

## 2024-09-12 RX ORDER — LITHIUM CARBONATE 450 MG
450 TABLET, EXTENDED RELEASE ORAL DAILY
COMMUNITY

## 2024-09-12 ASSESSMENT — PATIENT HEALTH QUESTIONNAIRE - PHQ9
SUM OF ALL RESPONSES TO PHQ QUESTIONS 1-9: 0
SUM OF ALL RESPONSES TO PHQ9 QUESTIONS 1 & 2: 0
SUM OF ALL RESPONSES TO PHQ QUESTIONS 1-9: 0
2. FEELING DOWN, DEPRESSED OR HOPELESS: NOT AT ALL
1. LITTLE INTEREST OR PLEASURE IN DOING THINGS: NOT AT ALL
SUM OF ALL RESPONSES TO PHQ QUESTIONS 1-9: 0
SUM OF ALL RESPONSES TO PHQ QUESTIONS 1-9: 0

## 2024-11-21 RX ORDER — ATORVASTATIN CALCIUM 20 MG/1
20 TABLET, FILM COATED ORAL DAILY
Qty: 90 TABLET | Refills: 1 | Status: SHIPPED | OUTPATIENT
Start: 2024-11-21

## 2024-11-21 NOTE — TELEPHONE ENCOUNTER
PCP: Nate Iraheta MD    Last Visit 9/12/2024   Future Appointments   Date Time Provider Department Center   3/14/2025 10:30 AM Nate Iraheta MD [facility] DEP       Requested Prescriptions     Pending Prescriptions Disp Refills    atorvastatin (LIPITOR) 20 MG tablet [Pharmacy Med Name: ATORVASTATIN 20MG TABLETS] 90 tablet 1     Sig: TAKE 1 TABLET BY MOUTH DAILY         Other Comments: Last Refill

## 2024-12-05 RX ORDER — OMEPRAZOLE 40 MG/1
40 CAPSULE, DELAYED RELEASE ORAL DAILY
Qty: 90 CAPSULE | Refills: 0 | Status: SHIPPED | OUTPATIENT
Start: 2024-12-05

## 2024-12-05 NOTE — TELEPHONE ENCOUNTER
/PCP: Nate Iraheta MD    Last Visit 9/12/2024   Future Appointments   Date Time Provider Department Center   3/14/2025 10:30 AM Nate Iraheta MD [facility]       Requested Prescriptions     Pending Prescriptions Disp Refills    omeprazole (PRILOSEC) 40 MG delayed release capsule [Pharmacy Med Name: OMEPRAZOLE 40MG CAPSULES] 90 capsule 0     Sig: TAKE 1 CAPSULE BY MOUTH DAILY         Other Comments: Last Refill 09/03/24

## 2024-12-27 DIAGNOSIS — D64.9 ANEMIA, UNSPECIFIED TYPE: ICD-10-CM

## 2024-12-27 RX ORDER — FERROUS SULFATE 325(65) MG
1 TABLET ORAL 2 TIMES DAILY
Qty: 180 TABLET | Refills: 0 | Status: SHIPPED | OUTPATIENT
Start: 2024-12-27

## 2024-12-27 NOTE — TELEPHONE ENCOUNTER
PCP: Nate Iraheta MD    Last Visit 9/12/2024   Future Appointments   Date Time Provider Department Center   3/14/2025 10:30 AM Nate Iraheta MD [facility]       Requested Prescriptions     Pending Prescriptions Disp Refills    FEROSUL 325 (65 Fe) MG tablet [Pharmacy Med Name: FERROUS SULFATE 325MG (5GR) TABS] 180 tablet 1     Sig: TAKE 1 TABLET BY MOUTH TWICE DAILY         Other Comments: Last Refill

## 2025-02-13 ENCOUNTER — TELEPHONE (OUTPATIENT)
Dept: PRIMARY CARE CLINIC | Facility: CLINIC | Age: 70
End: 2025-02-13

## 2025-02-13 DIAGNOSIS — J32.9 SINUSITIS, UNSPECIFIED CHRONICITY, UNSPECIFIED LOCATION: Primary | ICD-10-CM

## 2025-02-13 RX ORDER — AZITHROMYCIN 250 MG/1
TABLET, FILM COATED ORAL
Qty: 6 TABLET | Refills: 0 | Status: SHIPPED | OUTPATIENT
Start: 2025-02-13 | End: 2025-02-23

## 2025-02-24 RX ORDER — ATORVASTATIN CALCIUM 20 MG/1
20 TABLET, FILM COATED ORAL DAILY
Qty: 90 TABLET | Refills: 1 | Status: SHIPPED | OUTPATIENT
Start: 2025-02-24

## 2025-02-24 NOTE — TELEPHONE ENCOUNTER
PCP: Nate Iraheta MD    Last Visit 9/12/2024   Future Appointments   Date Time Provider Department Center   3/14/2025 10:30 AM Nate Iraheta MD [facility]       Requested Prescriptions     Pending Prescriptions Disp Refills    atorvastatin (LIPITOR) 20 MG tablet [Pharmacy Med Name: ATORVASTATIN 20MG TABLETS] 90 tablet 1     Sig: TAKE 1 TABLET BY MOUTH DAILY         Other Comments: Last Refill   11/21/24

## 2025-03-07 RX ORDER — OMEPRAZOLE 40 MG/1
40 CAPSULE, DELAYED RELEASE ORAL DAILY
Qty: 90 CAPSULE | Refills: 0 | Status: SHIPPED | OUTPATIENT
Start: 2025-03-07

## 2025-03-07 NOTE — TELEPHONE ENCOUNTER
PCP: Nate Iraheta MD    Last Visit 9/12/2024   Future Appointments   Date Time Provider Department Center   3/14/2025 10:30 AM Nate Iraheta MD [facility]       Requested Prescriptions     Pending Prescriptions Disp Refills    omeprazole (PRILOSEC) 40 MG delayed release capsule [Pharmacy Med Name: OMEPRAZOLE 40MG CAPSULES] 90 capsule 0     Sig: TAKE 1 CAPSULE BY MOUTH DAILY         Other Comments: Last Refill

## 2025-03-12 ENCOUNTER — TELEPHONE (OUTPATIENT)
Dept: PRIMARY CARE CLINIC | Facility: CLINIC | Age: 70
End: 2025-03-12

## 2025-03-12 SDOH — HEALTH STABILITY: PHYSICAL HEALTH: ON AVERAGE, HOW MANY DAYS PER WEEK DO YOU ENGAGE IN MODERATE TO STRENUOUS EXERCISE (LIKE A BRISK WALK)?: 1 DAY

## 2025-03-12 SDOH — HEALTH STABILITY: PHYSICAL HEALTH: ON AVERAGE, HOW MANY MINUTES DO YOU ENGAGE IN EXERCISE AT THIS LEVEL?: 20 MIN

## 2025-03-12 ASSESSMENT — PATIENT HEALTH QUESTIONNAIRE - PHQ9
2. FEELING DOWN, DEPRESSED OR HOPELESS: SEVERAL DAYS
1. LITTLE INTEREST OR PLEASURE IN DOING THINGS: SEVERAL DAYS
SUM OF ALL RESPONSES TO PHQ QUESTIONS 1-9: 2

## 2025-03-12 ASSESSMENT — LIFESTYLE VARIABLES
HOW OFTEN DO YOU HAVE A DRINK CONTAINING ALCOHOL: 1
HOW OFTEN DO YOU HAVE A DRINK CONTAINING ALCOHOL: NEVER
HOW MANY STANDARD DRINKS CONTAINING ALCOHOL DO YOU HAVE ON A TYPICAL DAY: PATIENT DOES NOT DRINK
HOW MANY STANDARD DRINKS CONTAINING ALCOHOL DO YOU HAVE ON A TYPICAL DAY: 0
HOW OFTEN DO YOU HAVE SIX OR MORE DRINKS ON ONE OCCASION: 1

## 2025-03-14 ENCOUNTER — OFFICE VISIT (OUTPATIENT)
Dept: PRIMARY CARE CLINIC | Facility: CLINIC | Age: 70
End: 2025-03-14

## 2025-03-14 VITALS
OXYGEN SATURATION: 93 % | WEIGHT: 251.6 LBS | RESPIRATION RATE: 18 BRPM | DIASTOLIC BLOOD PRESSURE: 57 MMHG | SYSTOLIC BLOOD PRESSURE: 108 MMHG | TEMPERATURE: 97.8 F | HEART RATE: 76 BPM | BODY MASS INDEX: 34.08 KG/M2 | HEIGHT: 72 IN

## 2025-03-14 DIAGNOSIS — E78.00 PURE HYPERCHOLESTEROLEMIA, UNSPECIFIED: ICD-10-CM

## 2025-03-14 DIAGNOSIS — Z12.5 PROSTATE CANCER SCREENING: ICD-10-CM

## 2025-03-14 DIAGNOSIS — G20.A1 PARKINSON'S DISEASE WITHOUT FLUCTUATING MANIFESTATIONS, UNSPECIFIED WHETHER DYSKINESIA PRESENT (HCC): ICD-10-CM

## 2025-03-14 DIAGNOSIS — C83.10 MANTLE CELL LYMPHOMA, UNSPECIFIED BODY REGION (HCC): ICD-10-CM

## 2025-03-14 DIAGNOSIS — I27.20 PULMONARY HYPERTENSION (HCC): ICD-10-CM

## 2025-03-14 DIAGNOSIS — G25.0 ESSENTIAL TREMOR: ICD-10-CM

## 2025-03-14 DIAGNOSIS — Z00.00 MEDICARE ANNUAL WELLNESS VISIT, SUBSEQUENT: Primary | ICD-10-CM

## 2025-03-14 DIAGNOSIS — Z23 NEED FOR VACCINATION: ICD-10-CM

## 2025-03-14 DIAGNOSIS — G47.33 OSA ON CPAP: ICD-10-CM

## 2025-03-14 DIAGNOSIS — I48.91 ATRIAL FIBRILLATION, UNSPECIFIED TYPE (HCC): ICD-10-CM

## 2025-03-14 DIAGNOSIS — D64.9 ANEMIA, UNSPECIFIED TYPE: ICD-10-CM

## 2025-03-14 DIAGNOSIS — E55.9 VITAMIN D DEFICIENCY: ICD-10-CM

## 2025-03-14 DIAGNOSIS — J44.9 CHRONIC OBSTRUCTIVE PULMONARY DISEASE, UNSPECIFIED COPD TYPE (HCC): ICD-10-CM

## 2025-03-14 LAB
25(OH)D3 SERPL-MCNC: 37.3 NG/ML (ref 30–100)
ALBUMIN SERPL-MCNC: 3.9 G/DL (ref 3.5–5)
ALBUMIN/GLOB SERPL: 1.7 (ref 1.1–2.2)
ALP SERPL-CCNC: 69 U/L (ref 45–117)
ALT SERPL-CCNC: 33 U/L (ref 12–78)
ANION GAP SERPL CALC-SCNC: 4 MMOL/L (ref 2–12)
AST SERPL-CCNC: 28 U/L (ref 15–37)
BILIRUB SERPL-MCNC: 0.3 MG/DL (ref 0.2–1)
BUN SERPL-MCNC: 24 MG/DL (ref 6–20)
BUN/CREAT SERPL: 27 (ref 12–20)
CALCIUM SERPL-MCNC: 9.5 MG/DL (ref 8.5–10.1)
CHLORIDE SERPL-SCNC: 105 MMOL/L (ref 97–108)
CHOLEST SERPL-MCNC: 149 MG/DL
CO2 SERPL-SCNC: 31 MMOL/L (ref 21–32)
CREAT SERPL-MCNC: 0.9 MG/DL (ref 0.7–1.3)
ERYTHROCYTE [DISTWIDTH] IN BLOOD BY AUTOMATED COUNT: 14.3 % (ref 11.5–14.5)
GLOBULIN SER CALC-MCNC: 2.3 G/DL (ref 2–4)
GLUCOSE SERPL-MCNC: 87 MG/DL (ref 65–100)
HCT VFR BLD AUTO: 37.7 % (ref 36.6–50.3)
HDLC SERPL-MCNC: 51 MG/DL
HDLC SERPL: 2.9 (ref 0–5)
HGB BLD-MCNC: 12 G/DL (ref 12.1–17)
LDLC SERPL CALC-MCNC: 63.4 MG/DL (ref 0–100)
MCH RBC QN AUTO: 31.1 PG (ref 26–34)
MCHC RBC AUTO-ENTMCNC: 31.8 G/DL (ref 30–36.5)
MCV RBC AUTO: 97.7 FL (ref 80–99)
NRBC # BLD: 0 K/UL (ref 0–0.01)
NRBC BLD-RTO: 0 PER 100 WBC
PLATELET # BLD AUTO: 105 K/UL (ref 150–400)
POTASSIUM SERPL-SCNC: 4.7 MMOL/L (ref 3.5–5.1)
PROT SERPL-MCNC: 6.2 G/DL (ref 6.4–8.2)
PSA SERPL-MCNC: 0 NG/ML (ref 0.01–4)
RBC # BLD AUTO: 3.86 M/UL (ref 4.1–5.7)
SODIUM SERPL-SCNC: 140 MMOL/L (ref 136–145)
TRIGL SERPL-MCNC: 173 MG/DL
VLDLC SERPL CALC-MCNC: 34.6 MG/DL
WBC # BLD AUTO: 3.7 K/UL (ref 4.1–11.1)

## 2025-03-14 SDOH — ECONOMIC STABILITY: FOOD INSECURITY: WITHIN THE PAST 12 MONTHS, YOU WORRIED THAT YOUR FOOD WOULD RUN OUT BEFORE YOU GOT MONEY TO BUY MORE.: NEVER TRUE

## 2025-03-14 SDOH — ECONOMIC STABILITY: FOOD INSECURITY: WITHIN THE PAST 12 MONTHS, THE FOOD YOU BOUGHT JUST DIDN'T LAST AND YOU DIDN'T HAVE MONEY TO GET MORE.: NEVER TRUE

## 2025-03-14 ASSESSMENT — PATIENT HEALTH QUESTIONNAIRE - PHQ9
SUM OF ALL RESPONSES TO PHQ QUESTIONS 1-9: 2
SUM OF ALL RESPONSES TO PHQ QUESTIONS 1-9: 2
2. FEELING DOWN, DEPRESSED OR HOPELESS: SEVERAL DAYS
SUM OF ALL RESPONSES TO PHQ QUESTIONS 1-9: 2
1. LITTLE INTEREST OR PLEASURE IN DOING THINGS: SEVERAL DAYS
SUM OF ALL RESPONSES TO PHQ QUESTIONS 1-9: 2

## 2025-03-14 NOTE — PROGRESS NOTES
Partha Crowe is a 69 y.o. male and presents with     No chief complaint on file.      History of Present Illness  The patient presents for evaluation of COPD, compression fracture, Parkinson's disease, and health maintenance.    He experienced a fall in 10/2024 or 11/2024, resulting in a double compression fracture. The incident occurred when he tripped over his cannula tubing at home, causing him to land on his right side on a hardwood floor. This necessitated a 5-day hospital stay during which he underwent kyphoplasty of 2 vertebrae. He continues to experience significant pain in the affected area, particularly when walking on concrete floors. He utilizes a walker for outdoor mobility and has ceased driving. His activity is limited due to tremors and oxygen requirements.    He is under the care of a pulmonologist for COPD, despite not meeting the typical parameters for this diagnosis. He operates at 20% lung capacity and requires supplemental oxygen after exertion. He is currently on 2 liters of oxygen. He has no history of smoking or occupational exposure to lung irritants. He does not recall undergoing a bronchoscopy. He also has a diagnosis of sleep apnea and uses a CPAP machine.    He was previously on levodopa-carbidopa and Eliquis, but these were discontinued by his specialist in anticipation of a focused ultrasound procedure. However, the procedure was not performed due to his agitation. He has since developed tremors in his right hand, while those in his left hand have remained unchanged.    He is scheduled to receive his second COVID-19 vaccine tomorrow. He has received the Shingrix vaccine but has not yet received the pneumococcal vaccine. He reports no visual disturbances, although he requires reading glasses for close work. He reports no abdominal lumps or bloating.    Supplemental Information  He is under the care of a specialist at [facility] for cancer. He does not see a cardiologist.    SOCIAL

## 2025-03-14 NOTE — PATIENT INSTRUCTIONS
disease. This lifestyle includes eating healthy, being active, staying at a weight that's healthy for you, and not smoking or using tobacco. It also includes taking medicines as directed, managing other health conditions, and trying to get a healthy amount of sleep.  Follow-up care is a key part of your treatment and safety. Be sure to make and go to all appointments, and call your doctor if you are having problems. It's also a good idea to know your test results and keep a list of the medicines you take.  How can you care for yourself at home?  Diet    Use less salt when you cook and eat. This helps lower your blood pressure. Taste food before salting. Add only a little salt when you think you need it. With time, your taste buds will adjust to less salt.     Eat fewer snack items, fast foods, canned soups, and other high-salt, high-fat, processed foods.     Read food labels and try to avoid saturated and trans fats. They increase your risk of heart disease by raising cholesterol levels.     Limit the amount of solid fat--butter, margarine, and shortening--you eat. Use olive, peanut, or canola oil when you cook. Bake, broil, and steam foods instead of frying them.     Eat a variety of fruit and vegetables every day. Dark green, deep orange, red, or yellow fruits and vegetables are especially good for you. Examples include spinach, carrots, peaches, and berries.     Foods high in fiber can reduce your cholesterol and provide important vitamins and minerals. High-fiber foods include whole-grain cereals and breads, oatmeal, beans, brown rice, citrus fruits, and apples.     Eat lean proteins. Heart-healthy proteins include seafood, lean meats and poultry, eggs, beans, peas, nuts, seeds, and soy products.     Limit drinks and foods with added sugar. These include candy, desserts, and soda pop.   Heart-healthy lifestyle    If your doctor recommends it, get more exercise. For many people, walking is a good choice. Or you

## 2025-03-16 ENCOUNTER — RESULTS FOLLOW-UP (OUTPATIENT)
Dept: PRIMARY CARE CLINIC | Facility: CLINIC | Age: 70
End: 2025-03-16

## 2025-03-18 RX ORDER — AZELASTINE 1 MG/ML
1 SPRAY, METERED NASAL 2 TIMES DAILY
Qty: 30 ML | Refills: 4 | Status: SHIPPED | OUTPATIENT
Start: 2025-03-18

## 2025-03-18 RX ORDER — AZELASTINE 1 MG/ML
SPRAY, METERED NASAL
Qty: 30 ML | Refills: 4 | Status: SHIPPED | OUTPATIENT
Start: 2025-03-18

## 2025-03-18 NOTE — TELEPHONE ENCOUNTER
PCP: Nate Iraheta MD    Last Visit 3/14/2025   Future Appointments   Date Time Provider Department Center   9/15/2025 11:00 AM Nate Iraheta MD [facility]       Requested Prescriptions     Pending Prescriptions Disp Refills    azelastine (ASTELIN) 0.1 % nasal spray 30 mL 4     Sig: Use in each nostril as directed         Other Comments: Last Refill   06/10/24

## 2025-03-18 NOTE — TELEPHONE ENCOUNTER
Pt is calling requesting refill on -  azelastine (ASTELIN) 0.1 % nasal spray   And would like for it to be sent to -   [id number] - [place], [place] - [address] - P [phone] - F [phone]

## 2025-03-18 NOTE — TELEPHONE ENCOUNTER
PCP: Nate Iraheta MD    Last Visit 3/14/2025   Future Appointments   Date Time Provider Department Center   9/15/2025 11:00 AM Nate Iraheta MD [facility]       Requested Prescriptions     Pending Prescriptions Disp Refills    azelastine (ASTELIN) 0.1 % nasal spray [Pharmacy Med Name: AZELASTINE 0.1%(137MCG) NASAL-200SP] 30 mL 4     Sig: USE 1 SPRAY IN EACH NOSTRIL TWICE DAILY AS DIRECTED         Other Comments: Last Refill

## 2025-04-05 DIAGNOSIS — D64.9 ANEMIA, UNSPECIFIED TYPE: ICD-10-CM

## 2025-04-07 RX ORDER — FERROUS SULFATE 325(65) MG
1 TABLET ORAL 2 TIMES DAILY
Qty: 180 TABLET | Refills: 0 | Status: SHIPPED | OUTPATIENT
Start: 2025-04-07

## 2025-04-07 NOTE — TELEPHONE ENCOUNTER
PCP: Nate Iraheta MD    Last Visit 3/14/2025   Future Appointments   Date Time Provider Department Center   9/15/2025 11:00 AM Nate Iraheta MD [facility]       Requested Prescriptions     Pending Prescriptions Disp Refills    FEROSUL 325 (65 Fe) MG tablet [Pharmacy Med Name: FERROUS SULFATE 325MG (5GR) TABS] 180 tablet 0     Sig: TAKE 1 TABLET BY MOUTH TWICE DAILY         Other Comments: Last Refill   12/27/24

## 2025-06-07 RX ORDER — OMEPRAZOLE 40 MG/1
40 CAPSULE, DELAYED RELEASE ORAL DAILY
Qty: 90 CAPSULE | Refills: 0 | Status: SHIPPED | OUTPATIENT
Start: 2025-06-07

## 2025-06-26 DIAGNOSIS — L30.9 DERMATITIS: ICD-10-CM

## 2025-06-26 RX ORDER — MOMETASONE FUROATE 1 MG/G
CREAM TOPICAL
Qty: 45 G | Refills: 3 | Status: SHIPPED | OUTPATIENT
Start: 2025-06-26

## 2025-07-08 DIAGNOSIS — D64.9 ANEMIA, UNSPECIFIED TYPE: ICD-10-CM

## 2025-07-08 RX ORDER — FERROUS SULFATE 325(65) MG
1 TABLET ORAL 2 TIMES DAILY
Qty: 180 TABLET | Refills: 0 | Status: SHIPPED | OUTPATIENT
Start: 2025-07-08

## 2025-07-08 NOTE — TELEPHONE ENCOUNTER
PCP: Nate Iraheta MD    Last Visit 3/14/2025   Future Appointments   Date Time Provider Department Center   9/15/2025 11:00 AM Nate Iraheta MD [facility]       Requested Prescriptions     Pending Prescriptions Disp Refills    FEROSUL 325 (65 Fe) MG tablet [Pharmacy Med Name: FERROUS SULFATE 325MG (5GR) TABS] 180 tablet 0     Sig: TAKE 1 TABLET BY MOUTH TWICE DAILY         Other Comments: Last Refill   04/07/25

## 2025-08-01 ENCOUNTER — OFFICE VISIT (OUTPATIENT)
Age: 70
End: 2025-08-01

## 2025-08-01 VITALS
TEMPERATURE: 98 F | HEART RATE: 74 BPM | BODY MASS INDEX: 34.04 KG/M2 | RESPIRATION RATE: 19 BRPM | DIASTOLIC BLOOD PRESSURE: 73 MMHG | WEIGHT: 251 LBS | SYSTOLIC BLOOD PRESSURE: 116 MMHG | OXYGEN SATURATION: 94 %

## 2025-08-01 DIAGNOSIS — M25.511 ACUTE PAIN OF RIGHT SHOULDER: ICD-10-CM

## 2025-08-01 DIAGNOSIS — M25.551 RIGHT HIP PAIN: ICD-10-CM

## 2025-08-01 DIAGNOSIS — S20.211A CONTUSION OF RIBS, RIGHT, INITIAL ENCOUNTER: Primary | ICD-10-CM

## 2025-08-01 DIAGNOSIS — R07.81 RIB PAIN ON RIGHT SIDE: ICD-10-CM

## 2025-08-01 RX ORDER — DILTIAZEM HYDROCHLORIDE 30 MG/1
30 TABLET, FILM COATED ORAL 2 TIMES DAILY
COMMUNITY

## 2025-08-01 RX ORDER — BUDESONIDE AND FORMOTEROL FUMARATE DIHYDRATE 160; 4.5 UG/1; UG/1
AEROSOL RESPIRATORY (INHALATION)
COMMUNITY

## 2025-08-01 RX ORDER — APIXABAN 5 MG/1
5 TABLET, FILM COATED ORAL 2 TIMES DAILY
COMMUNITY

## 2025-08-01 RX ORDER — MULTIVITAMIN
1 TABLET ORAL DAILY
COMMUNITY

## 2025-08-01 NOTE — PATIENT INSTRUCTIONS
Patient was seen today for evaluation of right shoulder, rib and hip pain which has persisted since a fall that occurred 4 days ago  I suspect contusions to the shoulder, ribs and hip, but will order x-rays of these areas to rule out fracture  Please go to our [facility] location to have these x-rays done, they are aware that you are coming  The location of our [facility] office is: [address], [place]  After the x-rays, you may leave, I will contact you with these results once back  In the meantime, plan to continue treating with Tylenol/ibuprofen as needed for pain  Ice to the affected areas for 10 to 15 minutes at a time several times each day  Please go to the ER if you develop any severe shortness of breath, chest pain or if acutely worsening

## 2025-08-01 NOTE — PROGRESS NOTES
extremities.  He denies any shortness of breath or difficulty breathing.  He does report some pain in his right ribs with inspiration.  He does have a history of COPD and wears oxygen, breathing no worse than usual.  He is taking Excedrin without significant relief.  Denies any significant worsening of symptoms throughout the week, but denies improvement.         Vitals:    08/01/25 1047   BP: 116/73   BP Site: Right Upper Arm   Patient Position: Sitting   BP Cuff Size: Large Adult   Pulse: 74   Resp: 19   Temp: 98 °F (36.7 °C)   TempSrc: Oral   SpO2: 94%   Weight: 113.9 kg (251 lb)       Xray Result (most recent):  XR HIP RIGHT (2-3 VIEWS) 08/01/2025    Narrative  Examination: AP Pelvis,  Frog leg Xray views of the right hip    Comparison: None provided.    Findings:  There is no fracture or dislocation.  Osteoarthritic changes of the bilateral hips with ORIF of the proximal left  femur.  The femoral head retains its round shape.  Remaining osseous structures and soft tissues are remarkable for possible  spondylolysis and facet hypertrophy of the lower lumbar spine.    Impression  No acute fracture or other acute osseous findings.    If patient exhibits persistent point tenderness, or if unable to ambulate,  recommend CT/MRI to further evaluate for occult fracture as minimally displaced  fractures may be occult on plain radiography.    Electronically signed by: Jacques Qureshi M.D. on 08/01/2025 02:48:54 PM  /Eastern         Objective   Physical Exam  Vitals and nursing note reviewed.   Constitutional:       General: He is not in acute distress.     Appearance: Normal appearance. He is not ill-appearing.   HENT:      Head: Normocephalic and atraumatic.   Cardiovascular:      Rate and Rhythm: Normal rate and regular rhythm.      Pulses: Normal pulses.   Pulmonary:      Effort: Pulmonary effort is normal. No respiratory distress.      Breath sounds: Normal breath sounds. No wheezing, rhonchi or rales.   Chest:

## 2025-08-07 ENCOUNTER — TELEPHONE (OUTPATIENT)
Dept: PRIMARY CARE CLINIC | Facility: CLINIC | Age: 70
End: 2025-08-07

## 2025-08-08 ENCOUNTER — TELEPHONE (OUTPATIENT)
Dept: PRIMARY CARE CLINIC | Facility: CLINIC | Age: 70
End: 2025-08-08

## 2025-08-26 DIAGNOSIS — E78.00 HYPERCHOLESTEREMIA: Primary | ICD-10-CM

## 2025-08-26 RX ORDER — ATORVASTATIN CALCIUM 20 MG/1
20 TABLET, FILM COATED ORAL DAILY
Qty: 90 TABLET | Refills: 0 | Status: SHIPPED | OUTPATIENT
Start: 2025-08-26

## 2025-09-04 RX ORDER — OMEPRAZOLE 40 MG/1
40 CAPSULE, DELAYED RELEASE ORAL DAILY
Qty: 90 CAPSULE | Refills: 0 | Status: SHIPPED | OUTPATIENT
Start: 2025-09-04